# Patient Record
Sex: MALE | Race: WHITE | NOT HISPANIC OR LATINO | Employment: OTHER | URBAN - METROPOLITAN AREA
[De-identification: names, ages, dates, MRNs, and addresses within clinical notes are randomized per-mention and may not be internally consistent; named-entity substitution may affect disease eponyms.]

---

## 2017-08-17 ENCOUNTER — HOSPITAL ENCOUNTER (OUTPATIENT)
Dept: OTHER | Facility: HOSPITAL | Age: 72
Setting detail: SPECIMEN
Discharge: HOME | End: 2017-08-17
Attending: SURGERY | Admitting: SURGERY

## 2022-03-16 ENCOUNTER — HOSPITAL ENCOUNTER (EMERGENCY)
Facility: HOSPITAL | Age: 77
Discharge: HOME | End: 2022-03-16
Attending: EMERGENCY MEDICINE | Admitting: EMERGENCY MEDICINE

## 2022-03-16 VITALS
OXYGEN SATURATION: 100 % | HEART RATE: 85 BPM | WEIGHT: 202 LBS | TEMPERATURE: 98.1 F | SYSTOLIC BLOOD PRESSURE: 151 MMHG | BODY MASS INDEX: 29.92 KG/M2 | DIASTOLIC BLOOD PRESSURE: 76 MMHG | HEIGHT: 69 IN | RESPIRATION RATE: 15 BRPM

## 2022-03-16 DIAGNOSIS — I10 HYPERTENSION, UNSPECIFIED TYPE: ICD-10-CM

## 2022-03-16 DIAGNOSIS — R42 DIZZINESS: Primary | ICD-10-CM

## 2022-03-16 LAB
ANION GAP SERPL CALCULATED.3IONS-SCNC: 11 MMOL/L (ref 5–15)
BASOPHILS # BLD AUTO: 0 10*3/MM3 (ref 0–0.2)
BASOPHILS NFR BLD AUTO: 0.4 % (ref 0–1.5)
BUN SERPL-MCNC: 15 MG/DL (ref 8–23)
BUN/CREAT SERPL: 18.3 (ref 7–25)
CALCIUM SPEC-SCNC: 9 MG/DL (ref 8.6–10.5)
CHLORIDE SERPL-SCNC: 100 MMOL/L (ref 98–107)
CO2 SERPL-SCNC: 27 MMOL/L (ref 22–29)
CREAT SERPL-MCNC: 0.82 MG/DL (ref 0.76–1.27)
DEPRECATED RDW RBC AUTO: 45.1 FL (ref 37–54)
EGFRCR SERPLBLD CKD-EPI 2021: 89.9 ML/MIN/1.73
EOSINOPHIL # BLD AUTO: 0.3 10*3/MM3 (ref 0–0.4)
EOSINOPHIL NFR BLD AUTO: 2.4 % (ref 0.3–6.2)
ERYTHROCYTE [DISTWIDTH] IN BLOOD BY AUTOMATED COUNT: 15.7 % (ref 12.3–15.4)
GLUCOSE SERPL-MCNC: 159 MG/DL (ref 65–99)
HCT VFR BLD AUTO: 43.8 % (ref 37.5–51)
HGB BLD-MCNC: 14.7 G/DL (ref 13–17.7)
LYMPHOCYTES # BLD AUTO: 2.2 10*3/MM3 (ref 0.7–3.1)
LYMPHOCYTES NFR BLD AUTO: 20.2 % (ref 19.6–45.3)
MCH RBC QN AUTO: 27.4 PG (ref 26.6–33)
MCHC RBC AUTO-ENTMCNC: 33.5 G/DL (ref 31.5–35.7)
MCV RBC AUTO: 81.8 FL (ref 79–97)
MONOCYTES # BLD AUTO: 0.7 10*3/MM3 (ref 0.1–0.9)
MONOCYTES NFR BLD AUTO: 6.3 % (ref 5–12)
NEUTROPHILS NFR BLD AUTO: 7.5 10*3/MM3 (ref 1.7–7)
NEUTROPHILS NFR BLD AUTO: 70.7 % (ref 42.7–76)
NRBC BLD AUTO-RTO: 0 /100 WBC (ref 0–0.2)
PLATELET # BLD AUTO: 201 10*3/MM3 (ref 140–450)
PMV BLD AUTO: 7.1 FL (ref 6–12)
POTASSIUM SERPL-SCNC: 3.5 MMOL/L (ref 3.5–5.2)
RBC # BLD AUTO: 5.35 10*6/MM3 (ref 4.14–5.8)
SODIUM SERPL-SCNC: 138 MMOL/L (ref 136–145)
WBC NRBC COR # BLD: 10.7 10*3/MM3 (ref 3.4–10.8)
WHOLE BLOOD HOLD SPECIMEN: NORMAL

## 2022-03-16 PROCEDURE — 96374 THER/PROPH/DIAG INJ IV PUSH: CPT

## 2022-03-16 PROCEDURE — 36415 COLL VENOUS BLD VENIPUNCTURE: CPT

## 2022-03-16 PROCEDURE — 85025 COMPLETE CBC W/AUTO DIFF WBC: CPT | Performed by: EMERGENCY MEDICINE

## 2022-03-16 PROCEDURE — 93005 ELECTROCARDIOGRAM TRACING: CPT

## 2022-03-16 PROCEDURE — 99284 EMERGENCY DEPT VISIT MOD MDM: CPT

## 2022-03-16 PROCEDURE — 80048 BASIC METABOLIC PNL TOTAL CA: CPT | Performed by: EMERGENCY MEDICINE

## 2022-03-16 RX ORDER — SODIUM CHLORIDE 0.9 % (FLUSH) 0.9 %
10 SYRINGE (ML) INJECTION AS NEEDED
Status: DISCONTINUED | OUTPATIENT
Start: 2022-03-16 | End: 2022-03-16 | Stop reason: HOSPADM

## 2022-03-16 RX ORDER — ENALAPRILAT 2.5 MG/2ML
1.25 INJECTION INTRAVENOUS ONCE
Status: COMPLETED | OUTPATIENT
Start: 2022-03-16 | End: 2022-03-16

## 2022-03-16 RX ADMIN — ENALAPRILAT 1.25 MG: 2.5 INJECTION INTRAVENOUS at 12:04

## 2022-03-16 NOTE — ED PROVIDER NOTES
Subjective   Patient is a 76-year-old male who [facility].  Patient states he had some mild dizziness earlier today but is asymptomatic this time.  Denies headache chest pain shortness of breath or other complaint.          Review of Systems  Negative for headache earache throat neck pain cough fever chest pain shortness of breath abdominal pain vomiting diarrhea dysuria achiness weight loss or other complaint.  A complete review of system was obtained and is otherwise negative  No past medical history on file.    No Known Allergies    No past surgical history on file.    No family history on file.    Social History     Socioeconomic History   • Marital status:            Objective   Physical Exam  Neurologic exam is nonfocal.  HEENT exam shows TMs to be clear.  Oropharynx clear moist.  Sclerae nonicteric.  Neck has no adenopathy JVD or bruits.  Lungs are clear.  Heart has a regular rate rhythm without murmur rub or gallop.  Chest is nontender.  Abdomen is soft nontender.  Extremities M is no cyanosis or edema.  Procedures     My EKG interpretation shows normal sinus rhythm with no acute ST change at a rate of 100.      ED Course            Results for orders placed or performed during the hospital encounter of 03/16/22   Basic Metabolic Panel    Specimen: Blood   Result Value Ref Range    Glucose 159 (H) 65 - 99 mg/dL    BUN 15 8 - 23 mg/dL    Creatinine 0.82 0.76 - 1.27 mg/dL    Sodium 138 136 - 145 mmol/L    Potassium 3.5 3.5 - 5.2 mmol/L    Chloride 100 98 - 107 mmol/L    CO2 27.0 22.0 - 29.0 mmol/L    Calcium 9.0 8.6 - 10.5 mg/dL    BUN/Creatinine Ratio 18.3 7.0 - 25.0    Anion Gap 11.0 5.0 - 15.0 mmol/L    eGFR 89.9 >60.0 mL/min/1.73   CBC Auto Differential    Specimen: Blood   Result Value Ref Range    WBC 10.70 3.40 - 10.80 10*3/mm3    RBC 5.35 4.14 - 5.80 10*6/mm3    Hemoglobin 14.7 13.0 - 17.7 g/dL    Hematocrit 43.8 37.5 - 51.0 %    MCV 81.8 79.0 - 97.0 fL     MCH 27.4 26.6 - 33.0 pg    MCHC 33.5 31.5 - 35.7 g/dL    RDW 15.7 (H) 12.3 - 15.4 %    RDW-SD 45.1 37.0 - 54.0 fl    MPV 7.1 6.0 - 12.0 fL    Platelets 201 140 - 450 10*3/mm3    Neutrophil % 70.7 42.7 - 76.0 %    Lymphocyte % 20.2 19.6 - 45.3 %    Monocyte % 6.3 5.0 - 12.0 %    Eosinophil % 2.4 0.3 - 6.2 %    Basophil % 0.4 0.0 - 1.5 %    Neutrophils, Absolute 7.50 (H) 1.70 - 7.00 10*3/mm3    Lymphocytes, Absolute 2.20 0.70 - 3.10 10*3/mm3    Monocytes, Absolute 0.70 0.10 - 0.90 10*3/mm3    Eosinophils, Absolute 0.30 0.00 - 0.40 10*3/mm3    Basophils, Absolute 0.00 0.00 - 0.20 10*3/mm3    nRBC 0.0 0.0 - 0.2 /100 WBC   ECG 12 Lead   Result Value Ref Range    QT Interval 405 ms   Lavender Top   Result Value Ref Range    Extra Tube hold for add-on                                              MDM  Number of Diagnoses or Management Options  Diagnosis management comments: Patient had a nonfocal neurologic exam.  Metabolic panel is normal other than mild elevated glucose.  There is no evidence acute infectious process or anemia.  EKG shows no ectopy.  Patient was given enalapril 1.25 mg IV.  Current blood pressure is 140/80.  Patient remains asymptomatic.  Will be discharged.  He is instructed to follow-up with his family physician for further outpatient evaluation.       Amount and/or Complexity of Data Reviewed  Clinical lab tests: reviewed  Tests in the medicine section of CPT®: reviewed    Risk of Complications, Morbidity, and/or Mortality  Presenting problems: high  Diagnostic procedures: high  Management options: high    Patient Progress  Patient progress: stable      Final diagnoses:   Dizziness   Hypertension, unspecified type       ED Disposition  ED Disposition     ED Disposition   Discharge    Condition   Stable    Comment   --             No follow-up provider specified.       Medication List      No changes were made to your prescriptions during this visit.          Kai Greenfield MD  03/16/22 0450

## 2022-03-24 LAB — QT INTERVAL: 405 MS

## 2024-04-04 ENCOUNTER — HOSPITAL ENCOUNTER (OUTPATIENT)
Facility: HOSPITAL | Age: 79
Discharge: HOME | End: 2024-04-04
Attending: EMERGENCY MEDICINE | Admitting: EMERGENCY MEDICINE
Payer: MEDICARE

## 2024-04-04 VITALS
TEMPERATURE: 97.5 F | SYSTOLIC BLOOD PRESSURE: 157 MMHG | BODY MASS INDEX: 30.96 KG/M2 | OXYGEN SATURATION: 100 % | RESPIRATION RATE: 18 BRPM | HEIGHT: 69 IN | DIASTOLIC BLOOD PRESSURE: 75 MMHG | HEART RATE: 82 BPM | WEIGHT: 209 LBS

## 2024-04-04 DIAGNOSIS — N39.0 UTI (URINARY TRACT INFECTION) WITH PYURIA: Primary | ICD-10-CM

## 2024-04-04 LAB
BILIRUB UR QL STRIP: NEGATIVE
CLARITY UR: ABNORMAL
COLOR UR: YELLOW
GLUCOSE UR STRIP-MCNC: NEGATIVE MG/DL
HGB UR QL STRIP.AUTO: ABNORMAL
KETONES UR QL STRIP: NEGATIVE
LEUKOCYTE ESTERASE UR QL STRIP.AUTO: ABNORMAL
NITRITE UR QL STRIP: NEGATIVE
PH UR STRIP.AUTO: 6 [PH] (ref 5–8)
PROT UR QL STRIP: ABNORMAL
SP GR UR STRIP: 1.01 (ref 1–1.03)
UROBILINOGEN UR QL STRIP: ABNORMAL

## 2024-04-04 PROCEDURE — G0463 HOSPITAL OUTPT CLINIC VISIT: HCPCS | Performed by: EMERGENCY MEDICINE

## 2024-04-04 PROCEDURE — 99213 OFFICE O/P EST LOW 20 MIN: CPT | Performed by: EMERGENCY MEDICINE

## 2024-04-04 PROCEDURE — 81003 URINALYSIS AUTO W/O SCOPE: CPT | Performed by: EMERGENCY MEDICINE

## 2024-04-04 RX ORDER — SULFAMETHOXAZOLE AND TRIMETHOPRIM 800; 160 MG/1; MG/1
1 TABLET ORAL 2 TIMES DAILY
Qty: 14 TABLET | Refills: 0 | Status: SHIPPED | OUTPATIENT
Start: 2024-04-04 | End: 2024-04-04 | Stop reason: ALTCHOICE

## 2024-04-04 RX ORDER — TAMSULOSIN HYDROCHLORIDE 0.4 MG/1
1 CAPSULE ORAL DAILY
Qty: 30 CAPSULE | Refills: 0 | Status: SHIPPED | OUTPATIENT
Start: 2024-04-04

## 2024-04-04 RX ORDER — CEFDINIR 300 MG/1
300 CAPSULE ORAL 2 TIMES DAILY
Qty: 10 CAPSULE | Refills: 0 | Status: SHIPPED | OUTPATIENT
Start: 2024-04-04 | End: 2024-04-09

## 2024-04-04 NOTE — FSED PROVIDER NOTE
Subjective   History of Present Illness  Is a 79-year-old male presenting to the urgent care with chief complaint of difficulty with urination.  Patient states he was having frequent but small amounts of urine which started yesterday.  [facility] which showed bacteria in his urine.  Patient did not receive any antibiotics at that time.  Patient notes that her urine to be cloudy and dark.  He denies any abdominal pain.  No back pain no fever.        Review of Systems   Genitourinary:  Positive for difficulty urinating, frequency and urgency.   All other systems reviewed and are negative.      No past medical history on file.    No Known Allergies    No past surgical history on file.    No family history on file.    Social History     Socioeconomic History    Marital status:            Objective   Physical Exam  Vitals and nursing note reviewed.   Constitutional:       Appearance: Normal appearance.   HENT:      Head: Normocephalic and atraumatic.   Cardiovascular:      Rate and Rhythm: Normal rate.   Pulmonary:      Effort: Pulmonary effort is normal.      Breath sounds: Normal breath sounds.   Abdominal:      General: Abdomen is flat.      Palpations: Abdomen is soft.      Comments: Ventral hernia   Musculoskeletal:         General: Normal range of motion.   Skin:     General: Skin is warm and dry.      Capillary Refill: Capillary refill takes less than 2 seconds.   Neurological:      General: No focal deficit present.      Mental Status: He is alert and oriented to person, place, and time.   Psychiatric:         Mood and Affect: Mood normal.         Behavior: Behavior normal.         Procedures           ED Course                                 Bladder scan was reveals post void residual          Medical Decision Making  Problems Addressed:  UTI (urinary tract infection) with pyuria: complicated acute illness or injury    Risk  Prescription drug  management.        Final diagnoses:   UTI (urinary tract infection) with pyuria       ED Disposition  ED Disposition       ED Disposition   Discharge    Condition   Stable    Comment   --               Jonh Moran MD  [address] [place] [address]  [phone]    In 2 days  As needed    [place] [id number] [facility] [address] [phone]    If symptoms worsen         Medication List        New Prescriptions      sulfamethoxazole-trimethoprim 800-160 MG per tablet  Commonly known as: BACTRIM DS,SEPTRA DS  Take 1 tablet by mouth 2 (Two) Times a Day.     tamsulosin 0.4 MG capsule 24 hr capsule  Commonly known as: FLOMAX  Take 1 capsule by mouth Daily.               Where to Get Your Medications        These medications were sent to [id number] [place] [facility] [address] [facility] [address] - [phone] PH - [phone] FX  [address] [place] [address]      Phone: [phone]   sulfamethoxazole-trimethoprim 800-160 MG per tablet  tamsulosin 0.4 MG capsule 24 hr capsule

## 2024-05-20 ENCOUNTER — APPOINTMENT (OUTPATIENT)
Dept: GENERAL RADIOLOGY | Facility: HOSPITAL | Age: 79
End: 2024-05-20
Payer: MEDICARE

## 2024-05-20 ENCOUNTER — HOSPITAL ENCOUNTER (INPATIENT)
Facility: HOSPITAL | Age: 79
LOS: 2 days | Discharge: HOME | End: 2024-05-23
Attending: EMERGENCY MEDICINE | Admitting: INTERNAL MEDICINE
Payer: MEDICARE

## 2024-05-20 DIAGNOSIS — R31.9 URINARY TRACT INFECTION WITH HEMATURIA, SITE UNSPECIFIED: Primary | ICD-10-CM

## 2024-05-20 DIAGNOSIS — R55 SYNCOPE, UNSPECIFIED SYNCOPE TYPE: ICD-10-CM

## 2024-05-20 DIAGNOSIS — N39.0 URINARY TRACT INFECTION WITH HEMATURIA, SITE UNSPECIFIED: Primary | ICD-10-CM

## 2024-05-20 DIAGNOSIS — A41.9 SEPSIS, DUE TO UNSPECIFIED ORGANISM, UNSPECIFIED WHETHER ACUTE ORGAN DYSFUNCTION PRESENT: ICD-10-CM

## 2024-05-20 LAB
ALBUMIN SERPL-MCNC: 4.3 G/DL (ref 3.5–5.2)
ALBUMIN/GLOB SERPL: 1.2 G/DL
ALP SERPL-CCNC: 86 U/L (ref 39–117)
ALT SERPL W P-5'-P-CCNC: 20 U/L (ref 1–41)
ANION GAP SERPL CALCULATED.3IONS-SCNC: 13 MMOL/L (ref 5–15)
AST SERPL-CCNC: 23 U/L (ref 1–40)
BACTERIA UR QL AUTO: ABNORMAL /HPF
BASOPHILS # BLD AUTO: 0.05 10*3/MM3 (ref 0–0.2)
BASOPHILS NFR BLD AUTO: 0.3 % (ref 0–1.5)
BILIRUB SERPL-MCNC: 0.7 MG/DL (ref 0–1.2)
BILIRUB UR QL STRIP: NEGATIVE
BUN SERPL-MCNC: 16 MG/DL (ref 8–23)
BUN/CREAT SERPL: 16.3 (ref 7–25)
CALCIUM SPEC-SCNC: 9.4 MG/DL (ref 8.6–10.5)
CHLORIDE SERPL-SCNC: 101 MMOL/L (ref 98–107)
CK SERPL-CCNC: 267 U/L (ref 20–200)
CLARITY UR: ABNORMAL
CO2 SERPL-SCNC: 24 MMOL/L (ref 22–29)
COLOR UR: YELLOW
CREAT SERPL-MCNC: 0.98 MG/DL (ref 0.76–1.27)
D-LACTATE SERPL-SCNC: 1.3 MMOL/L (ref 0.5–2)
D-LACTATE SERPL-SCNC: 1.4 MMOL/L (ref 0.3–2)
DEPRECATED RDW RBC AUTO: 48 FL (ref 37–54)
EGFRCR SERPLBLD CKD-EPI 2021: 77.5 ML/MIN/1.73
EOSINOPHIL # BLD AUTO: 0.01 10*3/MM3 (ref 0–0.4)
EOSINOPHIL NFR BLD AUTO: 0.1 % (ref 0.3–6.2)
ERYTHROCYTE [DISTWIDTH] IN BLOOD BY AUTOMATED COUNT: 15.1 % (ref 12.3–15.4)
GLOBULIN UR ELPH-MCNC: 3.5 GM/DL
GLUCOSE SERPL-MCNC: 180 MG/DL (ref 65–99)
GLUCOSE UR STRIP-MCNC: NEGATIVE MG/DL
HCT VFR BLD AUTO: 42.5 % (ref 37.5–51)
HGB BLD-MCNC: 13.4 G/DL (ref 13–17.7)
HGB UR QL STRIP.AUTO: ABNORMAL
HOLD SPECIMEN: NORMAL
HOLD SPECIMEN: NORMAL
HYALINE CASTS UR QL AUTO: ABNORMAL /LPF
IMM GRANULOCYTES # BLD AUTO: 0.2 10*3/MM3 (ref 0–0.05)
IMM GRANULOCYTES NFR BLD AUTO: 1 % (ref 0–0.5)
KETONES UR QL STRIP: ABNORMAL
LEUKOCYTE ESTERASE UR QL STRIP.AUTO: ABNORMAL
LYMPHOCYTES # BLD AUTO: 1.55 10*3/MM3 (ref 0.7–3.1)
LYMPHOCYTES NFR BLD AUTO: 7.9 % (ref 19.6–45.3)
MCH RBC QN AUTO: 27.2 PG (ref 26.6–33)
MCHC RBC AUTO-ENTMCNC: 31.5 G/DL (ref 31.5–35.7)
MCV RBC AUTO: 86.2 FL (ref 79–97)
MONOCYTES # BLD AUTO: 1.47 10*3/MM3 (ref 0.1–0.9)
MONOCYTES NFR BLD AUTO: 7.5 % (ref 5–12)
NEUTROPHILS NFR BLD AUTO: 16.32 10*3/MM3 (ref 1.7–7)
NEUTROPHILS NFR BLD AUTO: 83.2 % (ref 42.7–76)
NITRITE UR QL STRIP: POSITIVE
NRBC BLD AUTO-RTO: 0 /100 WBC (ref 0–0.2)
PH UR STRIP.AUTO: 5.5 [PH] (ref 5–8)
PLATELET # BLD AUTO: 191 10*3/MM3 (ref 140–450)
PMV BLD AUTO: 9.7 FL (ref 6–12)
POTASSIUM SERPL-SCNC: 4.1 MMOL/L (ref 3.5–5.2)
PROT SERPL-MCNC: 7.8 G/DL (ref 6–8.5)
PROT UR QL STRIP: ABNORMAL
RBC # BLD AUTO: 4.93 10*6/MM3 (ref 4.14–5.8)
RBC # UR STRIP: ABNORMAL /HPF
REF LAB TEST METHOD: ABNORMAL
SODIUM SERPL-SCNC: 138 MMOL/L (ref 136–145)
SP GR UR STRIP: 1.01 (ref 1–1.03)
SQUAMOUS #/AREA URNS HPF: ABNORMAL /HPF
TROPONIN T SERPL HS-MCNC: 11 NG/L
UROBILINOGEN UR QL STRIP: ABNORMAL
WBC # UR STRIP: ABNORMAL /HPF
WBC NRBC COR # BLD AUTO: 19.6 10*3/MM3 (ref 3.4–10.8)
WHOLE BLOOD HOLD COAG: NORMAL
WHOLE BLOOD HOLD SPECIMEN: NORMAL

## 2024-05-20 PROCEDURE — 83605 ASSAY OF LACTIC ACID: CPT | Performed by: EMERGENCY MEDICINE

## 2024-05-20 PROCEDURE — 93005 ELECTROCARDIOGRAM TRACING: CPT | Performed by: EMERGENCY MEDICINE

## 2024-05-20 PROCEDURE — 25010000002 MORPHINE PER 10 MG: Performed by: INTERNAL MEDICINE

## 2024-05-20 PROCEDURE — 72072 X-RAY EXAM THORAC SPINE 3VWS: CPT

## 2024-05-20 PROCEDURE — 25010000002 ENOXAPARIN PER 10 MG: Performed by: INTERNAL MEDICINE

## 2024-05-20 PROCEDURE — 80053 COMPREHEN METABOLIC PANEL: CPT | Performed by: EMERGENCY MEDICINE

## 2024-05-20 PROCEDURE — 36415 COLL VENOUS BLD VENIPUNCTURE: CPT

## 2024-05-20 PROCEDURE — G0378 HOSPITAL OBSERVATION PER HR: HCPCS

## 2024-05-20 PROCEDURE — 87154 CUL TYP ID BLD PTHGN 6+ TRGT: CPT | Performed by: EMERGENCY MEDICINE

## 2024-05-20 PROCEDURE — 25810000003 LACTATED RINGERS PER 1000 ML: Performed by: EMERGENCY MEDICINE

## 2024-05-20 PROCEDURE — 85025 COMPLETE CBC W/AUTO DIFF WBC: CPT | Performed by: EMERGENCY MEDICINE

## 2024-05-20 PROCEDURE — 72110 X-RAY EXAM L-2 SPINE 4/>VWS: CPT

## 2024-05-20 PROCEDURE — 83605 ASSAY OF LACTIC ACID: CPT | Performed by: INTERNAL MEDICINE

## 2024-05-20 PROCEDURE — 25810000003 SODIUM CHLORIDE 0.9 % SOLUTION: Performed by: INTERNAL MEDICINE

## 2024-05-20 PROCEDURE — 87040 BLOOD CULTURE FOR BACTERIA: CPT | Performed by: EMERGENCY MEDICINE

## 2024-05-20 PROCEDURE — 81001 URINALYSIS AUTO W/SCOPE: CPT | Performed by: EMERGENCY MEDICINE

## 2024-05-20 PROCEDURE — 87077 CULTURE AEROBIC IDENTIFY: CPT | Performed by: EMERGENCY MEDICINE

## 2024-05-20 PROCEDURE — 99285 EMERGENCY DEPT VISIT HI MDM: CPT

## 2024-05-20 PROCEDURE — 87186 SC STD MICRODIL/AGAR DIL: CPT | Performed by: EMERGENCY MEDICINE

## 2024-05-20 PROCEDURE — 82550 ASSAY OF CK (CPK): CPT | Performed by: EMERGENCY MEDICINE

## 2024-05-20 PROCEDURE — 84484 ASSAY OF TROPONIN QUANT: CPT | Performed by: EMERGENCY MEDICINE

## 2024-05-20 PROCEDURE — 25010000002 CEFTRIAXONE PER 250 MG: Performed by: EMERGENCY MEDICINE

## 2024-05-20 PROCEDURE — 87086 URINE CULTURE/COLONY COUNT: CPT | Performed by: EMERGENCY MEDICINE

## 2024-05-20 RX ORDER — ATORVASTATIN CALCIUM 20 MG/1
20 TABLET, FILM COATED ORAL NIGHTLY
COMMUNITY

## 2024-05-20 RX ORDER — TAMSULOSIN HYDROCHLORIDE 0.4 MG/1
0.4 CAPSULE ORAL DAILY
Status: DISCONTINUED | OUTPATIENT
Start: 2024-05-20 | End: 2024-05-23 | Stop reason: HOSPADM

## 2024-05-20 RX ORDER — MORPHINE SULFATE 2 MG/ML
2 INJECTION, SOLUTION INTRAMUSCULAR; INTRAVENOUS EVERY 4 HOURS PRN
Status: DISCONTINUED | OUTPATIENT
Start: 2024-05-20 | End: 2024-05-23 | Stop reason: HOSPADM

## 2024-05-20 RX ORDER — ONDANSETRON 2 MG/ML
4 INJECTION INTRAMUSCULAR; INTRAVENOUS EVERY 6 HOURS PRN
Status: DISCONTINUED | OUTPATIENT
Start: 2024-05-20 | End: 2024-05-23 | Stop reason: HOSPADM

## 2024-05-20 RX ORDER — BISACODYL 5 MG/1
5 TABLET, DELAYED RELEASE ORAL DAILY PRN
Status: DISCONTINUED | OUTPATIENT
Start: 2024-05-20 | End: 2024-05-23 | Stop reason: HOSPADM

## 2024-05-20 RX ORDER — MELATONIN
1000 NIGHTLY
COMMUNITY

## 2024-05-20 RX ORDER — LOSARTAN POTASSIUM 50 MG/1
25 TABLET ORAL NIGHTLY
COMMUNITY

## 2024-05-20 RX ORDER — POLYETHYLENE GLYCOL 3350 17 G/17G
17 POWDER, FOR SOLUTION ORAL DAILY PRN
Status: DISCONTINUED | OUTPATIENT
Start: 2024-05-20 | End: 2024-05-23 | Stop reason: HOSPADM

## 2024-05-20 RX ORDER — SODIUM CHLORIDE 0.9 % (FLUSH) 0.9 %
10 SYRINGE (ML) INJECTION AS NEEDED
Status: DISCONTINUED | OUTPATIENT
Start: 2024-05-20 | End: 2024-05-23 | Stop reason: HOSPADM

## 2024-05-20 RX ORDER — MAGNESIUM OXIDE 400 MG/1
400 TABLET ORAL NIGHTLY
COMMUNITY

## 2024-05-20 RX ORDER — ENOXAPARIN SODIUM 100 MG/ML
40 INJECTION SUBCUTANEOUS DAILY
Status: DISCONTINUED | OUTPATIENT
Start: 2024-05-20 | End: 2024-05-23 | Stop reason: HOSPADM

## 2024-05-20 RX ORDER — ACETAMINOPHEN 325 MG/1
650 TABLET ORAL EVERY 6 HOURS PRN
Status: DISCONTINUED | OUTPATIENT
Start: 2024-05-20 | End: 2024-05-23 | Stop reason: HOSPADM

## 2024-05-20 RX ORDER — SODIUM CHLORIDE 0.9 % (FLUSH) 0.9 %
10 SYRINGE (ML) INJECTION EVERY 12 HOURS SCHEDULED
Status: DISCONTINUED | OUTPATIENT
Start: 2024-05-20 | End: 2024-05-23 | Stop reason: HOSPADM

## 2024-05-20 RX ORDER — BISACODYL 10 MG
10 SUPPOSITORY, RECTAL RECTAL DAILY PRN
Status: DISCONTINUED | OUTPATIENT
Start: 2024-05-20 | End: 2024-05-23 | Stop reason: HOSPADM

## 2024-05-20 RX ORDER — SODIUM CHLORIDE 9 MG/ML
40 INJECTION, SOLUTION INTRAVENOUS AS NEEDED
Status: DISCONTINUED | OUTPATIENT
Start: 2024-05-20 | End: 2024-05-23 | Stop reason: HOSPADM

## 2024-05-20 RX ORDER — AMOXICILLIN 250 MG
2 CAPSULE ORAL 2 TIMES DAILY PRN
Status: DISCONTINUED | OUTPATIENT
Start: 2024-05-20 | End: 2024-05-23 | Stop reason: HOSPADM

## 2024-05-20 RX ORDER — SODIUM CHLORIDE 9 MG/ML
100 INJECTION, SOLUTION INTRAVENOUS CONTINUOUS
Status: DISCONTINUED | OUTPATIENT
Start: 2024-05-20 | End: 2024-05-23 | Stop reason: HOSPADM

## 2024-05-20 RX ORDER — HYDROCODONE BITARTRATE AND ACETAMINOPHEN 5; 325 MG/1; MG/1
1 TABLET ORAL EVERY 6 HOURS PRN
Status: DISCONTINUED | OUTPATIENT
Start: 2024-05-20 | End: 2024-05-23 | Stop reason: HOSPADM

## 2024-05-20 RX ORDER — SODIUM CHLORIDE, SODIUM LACTATE, POTASSIUM CHLORIDE, CALCIUM CHLORIDE 600; 310; 30; 20 MG/100ML; MG/100ML; MG/100ML; MG/100ML
125 INJECTION, SOLUTION INTRAVENOUS CONTINUOUS
Status: DISCONTINUED | OUTPATIENT
Start: 2024-05-20 | End: 2024-05-20

## 2024-05-20 RX ORDER — AMLODIPINE BESYLATE 5 MG/1
2.5 TABLET ORAL NIGHTLY
COMMUNITY

## 2024-05-20 RX ADMIN — ENOXAPARIN SODIUM 40 MG: 100 INJECTION SUBCUTANEOUS at 16:31

## 2024-05-20 RX ADMIN — MORPHINE SULFATE 2 MG: 2 INJECTION, SOLUTION INTRAMUSCULAR; INTRAVENOUS at 17:47

## 2024-05-20 RX ADMIN — ACETAMINOPHEN 650 MG: 325 TABLET, FILM COATED ORAL at 17:47

## 2024-05-20 RX ADMIN — SODIUM CHLORIDE 100 ML/HR: 9 INJECTION, SOLUTION INTRAVENOUS at 16:08

## 2024-05-20 RX ADMIN — SODIUM CHLORIDE, POTASSIUM CHLORIDE, SODIUM LACTATE AND CALCIUM CHLORIDE 125 ML/HR: 600; 310; 30; 20 INJECTION, SOLUTION INTRAVENOUS at 12:50

## 2024-05-20 RX ADMIN — MORPHINE SULFATE 2 MG: 2 INJECTION, SOLUTION INTRAMUSCULAR; INTRAVENOUS at 22:58

## 2024-05-20 RX ADMIN — CEFTRIAXONE 1000 MG: 1 INJECTION, POWDER, FOR SOLUTION INTRAMUSCULAR; INTRAVENOUS at 12:49

## 2024-05-20 NOTE — ED PROVIDER NOTES
Subjective   History of Present Illness  79-year-old male presents with complaints he had nausea last night was sitting when he passed out.  He states he was on the floor for couple of hours.  He reports no fever.  He complains of pain in his mid back.  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  No vomiting or diarrhea.  He reports he has had some burning with urination.  Review of Systems    No past medical history on file.  BPH hyperlipidemia hypertension  No Known Allergies    No past surgical history on file.    No family history on file.    Social History     Socioeconomic History    Marital status:      Home medications include amlodipine Lipitor Cozaar      Objective   Physical Exam  79-year-old male awake alert.  Generally well-developed well-nourished.  He is very hard of hearing.  Pupils equal round react light.  No pharynx tenderness.  Some erythema to right side of neck anteriorly underneath chin.  No significant tenderness.  He complains of lower thoracic upper lumbar pain.  Chest clear equal breath sounds.  Cardiovascular regular rhythm abdomen soft nontender.   reveals normal male genitalia without abnormality noted.  He has minor contusion to right knee.  He has intact movement of legs neurovasc intact without deficit.  No obvious ligamentous laxity.  Neurologic exam without focal findings noted.  Procedures           ED Course      Results for orders placed or performed during the hospital encounter of 05/20/24   Comprehensive Metabolic Panel    Specimen: Blood   Result Value Ref Range    Glucose 180 (H) 65 - 99 mg/dL    BUN 16 8 - 23 mg/dL    Creatinine 0.98 0.76 - 1.27 mg/dL    Sodium 138 136 - 145 mmol/L    Potassium 4.1 3.5 - 5.2 mmol/L    Chloride 101 98 - 107 mmol/L    CO2 24.0 22.0 - 29.0 mmol/L    Calcium 9.4 8.6 - 10.5 mg/dL    Total Protein 7.8 6.0 - 8.5 g/dL    Albumin 4.3 3.5 - 5.2 g/dL    ALT (SGPT) 20 1 - 41 U/L    AST (SGOT) 23  1 - 40 U/L    Alkaline Phosphatase 86 39 - 117 U/L    Total Bilirubin 0.7 0.0 - 1.2 mg/dL    Globulin 3.5 gm/dL    A/G Ratio 1.2 g/dL    BUN/Creatinine Ratio 16.3 7.0 - 25.0    Anion Gap 13.0 5.0 - 15.0 mmol/L    eGFR 77.5 >60.0 mL/min/1.73   Single High Sensitivity Troponin T    Specimen: Blood   Result Value Ref Range    HS Troponin T 11 <22 ng/L   CK    Specimen: Blood   Result Value Ref Range    Creatine Kinase 267 (H) 20 - 200 U/L   CBC Auto Differential    Specimen: Blood   Result Value Ref Range    WBC 19.60 (H) 3.40 - 10.80 10*3/mm3    RBC 4.93 4.14 - 5.80 10*6/mm3    Hemoglobin 13.4 13.0 - 17.7 g/dL    Hematocrit 42.5 37.5 - 51.0 %    MCV 86.2 79.0 - 97.0 fL    MCH 27.2 26.6 - 33.0 pg    MCHC 31.5 31.5 - 35.7 g/dL    RDW 15.1 12.3 - 15.4 %    RDW-SD 48.0 37.0 - 54.0 fl    MPV 9.7 6.0 - 12.0 fL    Platelets 191 140 - 450 10*3/mm3    Neutrophil % 83.2 (H) 42.7 - 76.0 %    Lymphocyte % 7.9 (L) 19.6 - 45.3 %    Monocyte % 7.5 5.0 - 12.0 %    Eosinophil % 0.1 (L) 0.3 - 6.2 %    Basophil % 0.3 0.0 - 1.5 %    Immature Grans % 1.0 (H) 0.0 - 0.5 %    Neutrophils, Absolute 16.32 (H) 1.70 - 7.00 10*3/mm3    Lymphocytes, Absolute 1.55 0.70 - 3.10 10*3/mm3    Monocytes, Absolute 1.47 (H) 0.10 - 0.90 10*3/mm3    Eosinophils, Absolute 0.01 0.00 - 0.40 10*3/mm3    Basophils, Absolute 0.05 0.00 - 0.20 10*3/mm3    Immature Grans, Absolute 0.20 (H) 0.00 - 0.05 10*3/mm3    nRBC 0.0 0.0 - 0.2 /100 WBC   Urinalysis With Culture If Indicated - Urine, Clean Catch    Specimen: Urine, Clean Catch   Result Value Ref Range    Color, UA Yellow Yellow, Straw    Appearance, UA Hazy (A) Clear    pH, UA 5.5 5.0 - 8.0    Specific Gravity, UA 1.015 1.005 - 1.030    Glucose, UA Negative Negative    Ketones, UA 15 mg/dL (1+) (A) Negative    Bilirubin, UA Negative Negative    Blood, UA Moderate (2+) (A) Negative    Protein, UA 30 mg/dL (1+) (A) Negative    Leuk Esterase, UA Moderate (2+) (A) Negative    Nitrite, UA Positive (A) Negative     Urobilinogen, UA 0.2 E.U./dL 0.2 - 1.0 E.U./dL   Urinalysis, Microscopic Only - Urine, Clean Catch    Specimen: Urine, Clean Catch   Result Value Ref Range    RBC, UA 6-10 (A) None Seen, 0-2 /HPF    WBC, UA 21-50 (A) None Seen, 0-2 /HPF    Bacteria, UA 4+ (A) None Seen /HPF    Squamous Epithelial Cells, UA 0-2 None Seen, 0-2 /HPF    Hyaline Casts, UA 0-2 None Seen /LPF    Methodology Automated Microscopy    POC Lactate    Specimen: Blood   Result Value Ref Range    Lactate 1.4 0.3 - 2.0 mmol/L   ECG 12 Lead Syncope   Result Value Ref Range    QT Interval 372 ms    QTC Interval 447 ms   Green Top (Gel)   Result Value Ref Range    Extra Tube Hold for add-ons.    Lavender Top   Result Value Ref Range    Extra Tube hold for add-on    Gold Top - SST   Result Value Ref Range    Extra Tube Hold for add-ons.    Light Blue Top   Result Value Ref Range    Extra Tube Hold for add-ons.      XR Spine Thoracic 3 View    Result Date: 5/20/2024  Impression: 1. No acute findings within the thoracic or lumbar spine. 2. Advanced L4-5 and moderate L5-S1 diminished disc space height, along with anterior and posterior osteophyte formation. 3. Moderate anterolateral bridging osteophyte formation is demonstrated within the mid to lower thoracic spine. Electronically Signed: Lita Watters MD  5/20/2024 11:09 AM EDT  Workstation ID: [id number]    XR Spine Lumbar Complete 4+VW    Result Date: 5/20/2024  Impression: 1. No acute findings within the thoracic or lumbar spine. 2. Advanced L4-5 and moderate L5-S1 diminished disc space height, along with anterior and posterior osteophyte formation. 3. Moderate anterolateral bridging osteophyte formation is demonstrated within the mid to lower thoracic spine. Electronically Signed: Lita Watters MD  5/20/2024 11:09 AM EDT  Workstation ID: [id number]   Medications   sodium chloride 0.9 % flush 10 mL (has no administration in time range)   lactated ringers infusion (125 mL/hr Intravenous New Bag 5/20/24  "1250)   cefTRIAXone (ROCEPHIN) 1,000 mg in sodium chloride 0.9 % 100 mL MBP (1,000 mg Intravenous New Bag 5/20/24 1249)     /65   Pulse 86   Temp 97.9 °F (36.6 °C) (Oral)   Resp 16   Ht 175.3 cm (69\")   Wt 87.1 kg (192 lb)   SpO2 96%   BMI 28.35 kg/m²                                          Medical Decision Making  Problems Addressed:  Sepsis, due to unspecified organism, unspecified whether acute organ dysfunction present: complicated acute illness or injury  Syncope, unspecified syncope type: complicated acute illness or injury  Urinary tract infection with hematuria, site unspecified: complicated acute illness or injury    Amount and/or Complexity of Data Reviewed  Labs: ordered.  Radiology: ordered.  ECG/medicine tests: ordered.    Risk  Prescription drug management.  Decision regarding hospitalization.    Chart review: Patient had been treated for UTI at outside ER last month.  Culture does not appear to have been done.  Comorbidity: As per past history   Differential: Syncope, arrhythmia, orthostasis, sepsis, UTI, fracture  My EKG interpretation: Sinus rhythm rate of 87.  Compared to previous right bundle branch block no longer present sinus rhythm has replaced sinus tachycardia.  Cannot exclude old lateral infarct  Lab: Mild elevation of CK at 267 troponin normal at 11 CBC reveals a white count of 19.6 with hemoglobin 13.4 platelet count 191 83 segs no bands comprehensive metabolic panel remark for glucose of 180 urinalysis nitrite +20 1-50 white cells with 4+ bacteria this was sent for C&S, lactic acid normal 1.4  My Radiology review and interpretation: X-rays of thoracic and lumbar spine negative for fracture there are anterior osteophytes noted lumbar and thoracic spine.  Degenerative change most pronounced L4-5 and L5-S1.  Discussion/treatment: Patient's findings were discussed with him.  He was started IV fluids.  He was given Rocephin.  Patient was discussed with the hospitalist.  Will be " admitted to their service placed on telemetry for continued care further evaluation as needed  Patient was evaluated using appropriate PPE      Final diagnoses:   Urinary tract infection with hematuria, site unspecified   Sepsis, due to unspecified organism, unspecified whether acute organ dysfunction present   Syncope, unspecified syncope type       ED Disposition  ED Disposition       ED Disposition   Decision to Admit    Condition   --    Comment   Level of Care: Telemetry [5]   Admitting Physician: URBANO VASQUEZ [[id number]]                 No follow-up provider specified.       Medication List      No changes were made to your prescriptions during this visit.            Aron Waterman MD  05/20/24 4789

## 2024-05-20 NOTE — ED NOTES
Nursing report ED to floor  Almas Joseph  79 y.o.  male    HPI:   Chief Complaint   Patient presents with    Weakness - Generalized       Admitting doctor:   Audi Bergeron MD    Admitting diagnosis:   The primary encounter diagnosis was Urinary tract infection with hematuria, site unspecified. Diagnoses of Sepsis, due to unspecified organism, unspecified whether acute organ dysfunction present and Syncope, unspecified syncope type were also pertinent to this visit.    Code status:   Current Code Status       Date Active Code Status Order ID Comments User Context       5/20/2024 1346 CPR (Attempt to Resuscitate) [id number]  Audi Bergeron MD ED        Question Answer    Code Status (Patient has no pulse and is not breathing) CPR (Attempt to Resuscitate)    Medical Interventions (Patient has pulse or is breathing) Full Support    Level Of Support Discussed With Patient                    Allergies:   Bactrim [sulfamethoxazole-trimethoprim], Ciprofloxacin, and Nsaids    Isolation:  No active isolations     Fall Risk:  Fall Risk Assessment was completed, and patient is at high risk for falls.   Predictive Model Details         7 (Low) Factor Value    Calculated 5/20/2024 17:24 Age 79    Risk of Fall Model Active Peripheral IV Present     Imaging order in this encounter Present     Magnesium not on file     Number of Distinct Medication Classes administered 3     Drug Use Not Asked     Good Scale not on file     Diastolic BP 78     Clinically Relevant Sex Not Female     Total Bilirubin 0.7 mg/dL     Chloride 101 mmol/L     Gastrointestinal Assessment X     Respiratory Rate 16     Days after Admission 0.31     Number of administrations of Anti-Coagulants 1     Calcium 9.4 mg/dL     Albumin 4.3 g/dL     ALT 20 U/L     Potassium 4.1 mmol/L     Tobacco Use Not Asked     Creatinine 0.98 mg/dL         Weight:       05/20/24  0954   Weight: 87.1 kg (192 lb)       Intake and Output    Intake/Output Summary (Last 24  hours) at 5/20/2024 1724  Last data filed at 5/20/2024 1646  Gross per 24 hour   Intake 322.92 ml   Output 650 ml   Net -327.08 ml       Diet:   Dietary Orders (From admission, onward)       Start     Ordered    05/20/24 1520  Diet: Cardiac; Healthy Heart (2-3 Na+); Fluid Consistency: Thin (IDDSI 0)  Diet Effective Now        References:    Diet Order [facility]   Question Answer Comment   Diets: Cardiac    Cardiac Diet: Healthy Heart (2-3 Na+)    Fluid Consistency: Thin (IDDSI 0)        05/20/24 1519                     Most recent vitals:   Vitals:    05/20/24 1528 05/20/24 1558 05/20/24 1628 05/20/24 1658   BP: 168/79 148/84 146/84 158/78   Patient Position:       Pulse:    110   Resp:       Temp:       TempSrc:       SpO2:    94%   Weight:       Height:           Active LDAs/IV Access:   Lines, Drains & Airways       Active LDAs       Name Placement date Placement time Site Days    Peripheral IV 05/20/24 1249 Right Antecubital 05/20/24  1249  Antecubital  less than 1    Peripheral IV 05/20/24 1030 Left Antecubital 05/20/24  1030  Antecubital  less than 1                    Skin Condition:   Skin Assessments (last day)       None             Labs (abnormal labs have a star):   Labs Reviewed   COMPREHENSIVE METABOLIC PANEL - Abnormal; Notable for the following components:       Result Value    Glucose 180 (*)     All other components within normal limits    Narrative:     GFR Normal >60  Chronic Kidney Disease <60  Kidney Failure <15    The GFR formula is only valid for adults with stable renal function between ages 18 and 70.   CK - Abnormal; Notable for the following components:    Creatine Kinase 267 (*)     All other components within normal limits   CBC WITH AUTO DIFFERENTIAL - Abnormal; Notable for the following components:    WBC 19.60 (*)     Neutrophil % 83.2 (*)     Lymphocyte % 7.9 (*)     Eosinophil % 0.1 (*)     Immature Grans % 1.0 (*)     Neutrophils, Absolute 16.32 (*)     Monocytes, Absolute 1.47  (*)     Immature Grans, Absolute 0.20 (*)     All other components within normal limits   URINALYSIS W/ CULTURE IF INDICATED - Abnormal; Notable for the following components:    Appearance, UA Hazy (*)     Ketones, UA 15 mg/dL (1+) (*)     Blood, UA Moderate (2+) (*)     Protein, UA 30 mg/dL (1+) (*)     Leuk Esterase, UA Moderate (2+) (*)     Nitrite, UA Positive (*)     All other components within normal limits    Narrative:     In absence of clinical symptoms, the presence of pyuria, bacteria, and/or nitrites on the urinalysis result does not correlate with infection.   URINALYSIS, MICROSCOPIC ONLY - Abnormal; Notable for the following components:    RBC, UA 6-10 (*)     WBC, UA 21-50 (*)     Bacteria, UA 4+ (*)     All other components within normal limits   SINGLE HS TROPONIN T - Normal    Narrative:     High Sensitive Troponin T Reference Range:  <14.0 ng/L- Negative Female for AMI  <22.0 ng/L- Negative Male for AMI  >=14 - Abnormal Female indicating possible myocardial injury.  >=22 - Abnormal Male indicating possible myocardial injury.   Clinicians would have to utilize clinical acumen, EKG, Troponin, and serial changes to determine if it is an Acute Myocardial Infarction or myocardial injury due to an underlying chronic condition.        LACTIC ACID, PLASMA - Normal   POC LACTATE - Normal   URINE CULTURE   BLOOD CULTURE   BLOOD CULTURE   RAINBOW DRAW    Narrative:     The following orders were created for panel order [place] Draw.  Procedure                               Abnormality         Status                     ---------                               -----------         ------                     Green Top (Gel)[[id number]]                                  Final result               Lavender Top[[id number]]                                     Final result               Gold Top - SST[[id number]]                                   Final result               Light Blue Top[[id number]]                                    Final result                 Please view results for these tests on the individual orders.   CBC AND DIFFERENTIAL    Narrative:     The following orders were created for panel order CBC & Differential.  Procedure                               Abnormality         Status                     ---------                               -----------         ------                     CBC Auto Differential[[id number]]        Abnormal            Final result                 Please view results for these tests on the individual orders.   GREEN TOP   LAVENDER TOP   GOLD TOP - RUST   LIGHT BLUE TOP       LOC: Person, Place, Time, and Situation    Telemetry:  Med/Surg    Cardiac Monitoring Ordered: yes    EKG:   ECG 12 Lead Syncope   Preliminary Result   HEART RATE= 87  bpm   RR Interval= 692  ms   [place] Interval= 159  ms   P Horizontal Axis=   deg   P Front Axis= 32  deg   QRSD Interval= 97  ms   QT Interval= 372  ms   QTcB= 447  ms   QRS Axis= 3  deg   T Wave Axis= 42  deg   - NORMAL ECG -   Sinus rhythm   When compared with ECG of 16-Mar-2022 11:48:20,   Nonspecific significant change   Electronically Signed By:    Date and Time of Study: 2024-05-20 11:10:31          Medications Given in the ED:   Medications   sodium chloride 0.9 % flush 10 mL (has no administration in time range)   tamsulosin (FLOMAX) 24 hr capsule 0.4 mg (0.4 mg Oral Not Given 5/20/24 1618)   sodium chloride 0.9 % flush 10 mL (has no administration in time range)   sodium chloride 0.9 % flush 10 mL (has no administration in time range)   sodium chloride 0.9 % infusion 40 mL (has no administration in time range)   Pharmacy to Dose enoxaparin (LOVENOX) (has no administration in time range)   sodium chloride 0.9 % infusion (100 mL/hr Intravenous New Bag 5/20/24 1608)   Potassium Replacement - Follow Nurse / BPA Driven Protocol (has no administration in time range)   Magnesium Low Dose Replacement - Follow Nurse / BPA Driven Protocol (has no administration  in time range)   Phosphorus Replacement - Follow Nurse / BPA Driven Protocol (has no administration in time range)   Calcium Replacement - Follow Nurse / BPA Driven Protocol (has no administration in time range)   sennosides-docusate (PERICOLACE) 8.6-50 MG per tablet 2 tablet (has no administration in time range)     And   polyethylene glycol (MIRALAX) packet 17 g (has no administration in time range)     And   bisacodyl (DULCOLAX) EC tablet 5 mg (has no administration in time range)     And   bisacodyl (DULCOLAX) suppository 10 mg (has no administration in time range)   cefTRIAXone (ROCEPHIN) 1,000 mg in sodium chloride 0.9 % 100 mL MBP (has no administration in time range)   HYDROcodone-acetaminophen (NORCO) 5-325 MG per tablet 1 tablet (1 tablet Oral Not Given 5/20/24 1443)   morphine injection 2 mg (has no administration in time range)   ondansetron (ZOFRAN) injection 4 mg (has no administration in time range)   Enoxaparin Sodium (LOVENOX) syringe 40 mg (40 mg Subcutaneous Given 5/20/24 1631)   cefTRIAXone (ROCEPHIN) 1,000 mg in sodium chloride 0.9 % 100 mL MBP (0 mg Intravenous Stopped 5/20/24 1443)       Imaging results:  XR Spine Thoracic 3 View    Result Date: 5/20/2024  Impression: 1. No acute findings within the thoracic or lumbar spine. 2. Advanced L4-5 and moderate L5-S1 diminished disc space height, along with anterior and posterior osteophyte formation. 3. Moderate anterolateral bridging osteophyte formation is demonstrated within the mid to lower thoracic spine. Electronically Signed: Lita Watters MD  5/20/2024 11:09 AM EDT  Workstation ID: [id number]    XR Spine Lumbar Complete 4+VW    Result Date: 5/20/2024  Impression: 1. No acute findings within the thoracic or lumbar spine. 2. Advanced L4-5 and moderate L5-S1 diminished disc space height, along with anterior and posterior osteophyte formation. 3. Moderate anterolateral bridging osteophyte formation is demonstrated within the mid to lower thoracic  spine. Electronically Signed: Lita Watters MD  5/20/2024 11:09 AM EDT  Workstation ID: [id number]     Social issues:   Social History     Socioeconomic History    Marital status:        NIH Stroke Scale:  Interval: (not recorded)  1a. Level of Consciousness: (not recorded)  1b. LOC Questions: (not recorded)  1c. LOC Commands: (not recorded)  2. Best Gaze: (not recorded)  3. Visual: (not recorded)  4. Facial Palsy: (not recorded)  5a. Motor Arm, Left: (not recorded)  5b. Motor Arm, Right: (not recorded)  6a. Motor Leg, Left: (not recorded)  6b. Motor Leg, Right: (not recorded)  7. Limb Ataxia: (not recorded)  8. Sensory: (not recorded)  9. Best Language: (not recorded)  10. Dysarthria: (not recorded)  11. Extinction and Inattention (formerly Neglect): (not recorded)    Total (NIH Stroke Scale): (not recorded)     Additional notable assessment information:     Nursing report ED to floor:  Sindy zuniga 3C    Chelsea Jarrett RN   05/20/24 17:24 EDT

## 2024-05-20 NOTE — H&P
[facility]   HISTORY AND PHYSICAL    Patient Name: Almas Joseph  : 1945  MRN: [id number]  Primary Care Physician:  Rose Mary, No Known  Date of admission: 2024  Service Date and time: 24 14:17 EDT  Subjective   Subjective     Chief Complaint: pre-syncope    HPI:    Almas Joseph is a 79 y.o. male past medical history significant for hypertension, hyperlipidemia, diabetes mellitus, history of chronic back pain, who presented to the hospital earlier today after he passed out apparently while he was sitting, apparently felt nauseated the whole night.  He does complain of mid back pain, patient had a history of DISH syndrome, he denied any fever any chills he did have some mild burning sensation upon urination, patient was seen in ED where imaging studies including cervical and thoracic spine x-rays were done and showed no acute fracture but patient did have a urinary tract infection hence he received IV antibiotics and decision was made to admit him for further evaluation management.    Review of Systems   All systems were reviewed and negative except for: HPI    Personal History     No past medical history on file.    No past surgical history on file.    Family History: family history is not on file. Otherwise pertinent FHx was reviewed and not pertinent to current issue.    Social History:      Home Medications:  Menaquinone-7, amLODIPine, atorvastatin, cholecalciferol, losartan, magnesium oxide, and tamsulosin      Allergies:  Allergies   Allergen Reactions    Bactrim [Sulfamethoxazole-Trimethoprim] Hallucinations    Ciprofloxacin Myalgia    Nsaids Other (See Comments)     Had blood pressure issues when taking       Objective   Objective     Vitals:   Temp:  [97.9 °F (36.6 °C)] 97.9 °F (36.6 °C)  Heart Rate:  [] 103  Resp:  [16] 16  BP: (137-159)/(65-81) 159/81  Physical Exam    Constitutional: Awake, alert in no distress    Eyes: PERRLA, sclerae anicteric, no conjunctival  injection   HENT: NCAT, mucous membranes moist   Neck: Supple, no thyromegaly, no lymphadenopathy, trachea midline   Respiratory: Clear to auscultation bilaterally, nonlabored respirations    Cardiovascular: RRR, no murmurs, rubs, or gallops, palpable pedal pulses bilaterally   Gastrointestinal: Positive bowel sounds, soft, nontender, nondistended   Musculoskeletal: No bilateral ankle edema, no clubbing or cyanosis to extremities   Psychiatric: Appropriate affect, cooperative   Neurologic: Oriented x 3, strength symmetric in all extremities, Cranial Nerves grossly intact to confrontation, speech clear   Skin: No rashes     Result Review    Result Review:  I have personally reviewed the results from the time of this admission to 5/20/2024 14:17 EDT and agree with these findings:  [x]  Laboratory list / accordion  []  Microbiology  [x]  Radiology  []  EKG/Telemetry   []  Cardiology/Vascular   []  Pathology  []  Old records  []  Other:  Most notable findings include: Urinalysis hazy appearance ketones 15 mg/dL, moderate blood in the urine, protein urine 30 mg/dL, leukocyte esterase moderate, positive nitrite, glucose 180, creatinine kinase 267, WBC count 19.6, hemoglobin 13.4, hematocrit 42.5, platelet count 191           Thoracic spine and lumbar spine showed    1. No acute findings within the thoracic or lumbar spine.   2. Advanced L4-5 and moderate L5-S1 diminished disc space height, along with anterior and posterior osteophyte formation.   3. Moderate anterolateral bridging osteophyte formation is demonstrated within the mid to lower thoracic spine.     Assessment & Plan   Assessment / Plan       Active Hospital Problems:  Active Hospital Problems    Diagnosis     **Pre-syncope      Assessment:   This is an 79-year-old gentleman who presented to the hospital with  1.  Presyncope  2.  Status post fall  3.  Acute cystitis without hematuria  4.  History of diabetes mellitus type 2      Plan: The patient will be kept  in observation, he will be started on IV fluids and normal saline, patient will be started on IV Rocephin, will start pain medications with Tylenol, will order x-ray of restage.  Resume home medications.    DVT prophylaxis:  Medical DVT prophylaxis orders are signed and held.          CODE STATUS:    Level Of Support Discussed With: Patient  Code Status (Patient has no pulse and is not breathing): CPR (Attempt to Resuscitate)  Medical Interventions (Patient has pulse or is breathing): Full Support    Admission Status:  I believe this patient meets observation status.    Audi Bergeron MD

## 2024-05-20 NOTE — ED NOTES
Pt reports dysuria and diarrhea x2 days. This AM pt got dizzy and fell from the seated position. Positive LOC, no blood thinners, and did not hit his head. Pt reports low back to mid back pain. Pt takes BP medication.

## 2024-05-21 ENCOUNTER — APPOINTMENT (OUTPATIENT)
Dept: CT IMAGING | Facility: HOSPITAL | Age: 79
End: 2024-05-21
Payer: MEDICARE

## 2024-05-21 LAB
ANION GAP SERPL CALCULATED.3IONS-SCNC: 12 MMOL/L (ref 5–15)
BACTERIA BLD CULT: ABNORMAL
BASOPHILS # BLD AUTO: 0.04 10*3/MM3 (ref 0–0.2)
BASOPHILS NFR BLD AUTO: 0.2 % (ref 0–1.5)
BOTTLE TYPE: ABNORMAL
BUN SERPL-MCNC: 13 MG/DL (ref 8–23)
BUN/CREAT SERPL: 14.6 (ref 7–25)
CALCIUM SPEC-SCNC: 8.7 MG/DL (ref 8.6–10.5)
CHLORIDE SERPL-SCNC: 103 MMOL/L (ref 98–107)
CO2 SERPL-SCNC: 23 MMOL/L (ref 22–29)
CREAT SERPL-MCNC: 0.89 MG/DL (ref 0.76–1.27)
D-LACTATE SERPL-SCNC: 1.4 MMOL/L (ref 0.5–2)
D-LACTATE SERPL-SCNC: 2.4 MMOL/L (ref 0.5–2)
DEPRECATED RDW RBC AUTO: 45.7 FL (ref 37–54)
EGFRCR SERPLBLD CKD-EPI 2021: 86.1 ML/MIN/1.73
EOSINOPHIL # BLD AUTO: 0 10*3/MM3 (ref 0–0.4)
EOSINOPHIL NFR BLD AUTO: 0 % (ref 0.3–6.2)
ERYTHROCYTE [DISTWIDTH] IN BLOOD BY AUTOMATED COUNT: 15 % (ref 12.3–15.4)
GLUCOSE SERPL-MCNC: 169 MG/DL (ref 65–99)
HCT VFR BLD AUTO: 38.7 % (ref 37.5–51)
HGB BLD-MCNC: 12.4 G/DL (ref 13–17.7)
IMM GRANULOCYTES # BLD AUTO: 0.17 10*3/MM3 (ref 0–0.05)
IMM GRANULOCYTES NFR BLD AUTO: 0.9 % (ref 0–0.5)
LYMPHOCYTES # BLD AUTO: 1.9 10*3/MM3 (ref 0.7–3.1)
LYMPHOCYTES NFR BLD AUTO: 10 % (ref 19.6–45.3)
MCH RBC QN AUTO: 26.9 PG (ref 26.6–33)
MCHC RBC AUTO-ENTMCNC: 32 G/DL (ref 31.5–35.7)
MCV RBC AUTO: 83.9 FL (ref 79–97)
MONOCYTES # BLD AUTO: 1.42 10*3/MM3 (ref 0.1–0.9)
MONOCYTES NFR BLD AUTO: 7.4 % (ref 5–12)
NEUTROPHILS NFR BLD AUTO: 15.56 10*3/MM3 (ref 1.7–7)
NEUTROPHILS NFR BLD AUTO: 81.5 % (ref 42.7–76)
NRBC BLD AUTO-RTO: 0 /100 WBC (ref 0–0.2)
PLATELET # BLD AUTO: 164 10*3/MM3 (ref 140–450)
PMV BLD AUTO: 9.6 FL (ref 6–12)
POTASSIUM SERPL-SCNC: 3.5 MMOL/L (ref 3.5–5.2)
POTASSIUM SERPL-SCNC: 4.1 MMOL/L (ref 3.5–5.2)
QT INTERVAL: 372 MS
QTC INTERVAL: 447 MS
RBC # BLD AUTO: 4.61 10*6/MM3 (ref 4.14–5.8)
SODIUM SERPL-SCNC: 138 MMOL/L (ref 136–145)
WBC NRBC COR # BLD AUTO: 19.09 10*3/MM3 (ref 3.4–10.8)

## 2024-05-21 PROCEDURE — 84132 ASSAY OF SERUM POTASSIUM: CPT | Performed by: FAMILY MEDICINE

## 2024-05-21 PROCEDURE — 80048 BASIC METABOLIC PNL TOTAL CA: CPT | Performed by: INTERNAL MEDICINE

## 2024-05-21 PROCEDURE — 25010000002 MORPHINE PER 10 MG: Performed by: INTERNAL MEDICINE

## 2024-05-21 PROCEDURE — 25010000002 CEFTRIAXONE PER 250 MG: Performed by: NURSE PRACTITIONER

## 2024-05-21 PROCEDURE — 71250 CT THORAX DX C-: CPT

## 2024-05-21 PROCEDURE — 25010000002 ENOXAPARIN PER 10 MG: Performed by: INTERNAL MEDICINE

## 2024-05-21 PROCEDURE — 25810000003 SODIUM CHLORIDE 0.9 % SOLUTION: Performed by: INTERNAL MEDICINE

## 2024-05-21 PROCEDURE — 97165 OT EVAL LOW COMPLEX 30 MIN: CPT

## 2024-05-21 PROCEDURE — 85025 COMPLETE CBC W/AUTO DIFF WBC: CPT | Performed by: INTERNAL MEDICINE

## 2024-05-21 PROCEDURE — 83605 ASSAY OF LACTIC ACID: CPT

## 2024-05-21 PROCEDURE — 74176 CT ABD & PELVIS W/O CONTRAST: CPT

## 2024-05-21 PROCEDURE — 97535 SELF CARE MNGMENT TRAINING: CPT

## 2024-05-21 PROCEDURE — 97162 PT EVAL MOD COMPLEX 30 MIN: CPT

## 2024-05-21 RX ORDER — AMLODIPINE BESYLATE 2.5 MG/1
2.5 TABLET ORAL NIGHTLY
Status: DISCONTINUED | OUTPATIENT
Start: 2024-05-21 | End: 2024-05-23 | Stop reason: HOSPADM

## 2024-05-21 RX ORDER — POTASSIUM CHLORIDE 20 MEQ/1
40 TABLET, EXTENDED RELEASE ORAL EVERY 4 HOURS
Status: DISPENSED | OUTPATIENT
Start: 2024-05-21 | End: 2024-05-21

## 2024-05-21 RX ORDER — LOSARTAN POTASSIUM 25 MG/1
25 TABLET ORAL NIGHTLY
Status: DISCONTINUED | OUTPATIENT
Start: 2024-05-21 | End: 2024-05-23 | Stop reason: HOSPADM

## 2024-05-21 RX ORDER — ATORVASTATIN CALCIUM 20 MG/1
20 TABLET, FILM COATED ORAL NIGHTLY
Status: DISCONTINUED | OUTPATIENT
Start: 2024-05-21 | End: 2024-05-23 | Stop reason: HOSPADM

## 2024-05-21 RX ADMIN — ACETAMINOPHEN 650 MG: 325 TABLET, FILM COATED ORAL at 15:18

## 2024-05-21 RX ADMIN — AMLODIPINE BESYLATE 2.5 MG: 2.5 TABLET ORAL at 20:48

## 2024-05-21 RX ADMIN — Medication 10 ML: at 09:57

## 2024-05-21 RX ADMIN — ATORVASTATIN CALCIUM 20 MG: 20 TABLET, FILM COATED ORAL at 20:48

## 2024-05-21 RX ADMIN — MORPHINE SULFATE 2 MG: 2 INJECTION, SOLUTION INTRAMUSCULAR; INTRAVENOUS at 11:41

## 2024-05-21 RX ADMIN — TAMSULOSIN HYDROCHLORIDE 0.4 MG: 0.4 CAPSULE ORAL at 09:56

## 2024-05-21 RX ADMIN — MORPHINE SULFATE 2 MG: 2 INJECTION, SOLUTION INTRAMUSCULAR; INTRAVENOUS at 20:52

## 2024-05-21 RX ADMIN — CEFTRIAXONE 2000 MG: 2 INJECTION, POWDER, FOR SOLUTION INTRAMUSCULAR; INTRAVENOUS at 14:43

## 2024-05-21 RX ADMIN — SODIUM CHLORIDE 100 ML/HR: 9 INJECTION, SOLUTION INTRAVENOUS at 20:52

## 2024-05-21 RX ADMIN — MORPHINE SULFATE 2 MG: 2 INJECTION, SOLUTION INTRAMUSCULAR; INTRAVENOUS at 05:53

## 2024-05-21 RX ADMIN — ENOXAPARIN SODIUM 40 MG: 100 INJECTION SUBCUTANEOUS at 16:40

## 2024-05-21 NOTE — THERAPY EVALUATION
Patient Name: Almas Joseph  : 1945    MRN: [id number]                              Today's Date: 2024       Admit Date: 2024    Visit Dx:     ICD-10-CM ICD-9-CM   1. Urinary tract infection with hematuria, site unspecified  N39.0 599.0    R31.9 599.70   2. Sepsis, due to unspecified organism, unspecified whether acute organ dysfunction present  A41.9 038.9     995.91   3. Syncope, unspecified syncope type  R55 780.2     Patient Active Problem List   Diagnosis    Pre-syncope     Past Medical History:   Diagnosis Date    Chronic pain     Diabetes mellitus     Hyperlipidemia     Hypertension      Past Surgical History:   Procedure Laterality Date    BACK SURGERY      GALLBLADDER SURGERY      HERNIA REPAIR        General Information       Row Name 24 1535          OT Time and Intention    Document Type evaluation  -MP     Mode of Treatment individual therapy;occupational therapy  -MP       Row Name 24 1535          General Information    Patient Profile Reviewed yes  -MP     Existing Precautions/Restrictions fall  -MP       Row Name 24 1535          Living Environment    People in Home spouse  -MP       Row Name 24 1535          Cognition    Orientation Status (Cognition) oriented x 4  -MP       Row Name 24 1535          Safety Issues, Functional Mobility    Impairments Affecting Function (Mobility) balance;endurance/activity tolerance;pain;strength  -MP               User Key  (r) = Recorded By, (t) = Taken By, (c) = Cosigned By      Initials Name Provider Type    MP Armando Sandoval OT Occupational Therapist                     Mobility/ADL's       Row Name 24 1535          Bed Mobility    Bed Mobility bed mobility (all) activities  -MP     All Activities, [place] (Bed Mobility) moderate assist (50% patient effort);2 person assist  -MP     Assistive Device (Bed Mobility) bed rails  -MP       Row Name 24 1535          Sit-Stand Transfer     Sit-Stand [place] (Transfers) contact guard;minimum assist (75% patient effort);1 person assist  -MP     Assistive Device (Sit-Stand Transfers) walker, front-wheeled  -MP       Row Name 05/21/24 1535          Activities of Daily Living    BADL Assessment/Intervention lower body dressing  -MP       [facility] Name 05/21/24 1535          Lower Body Dressing Assessment/Training    [place] Level (Lower Body Dressing) lower body dressing skills;maximum assist (25% patient effort);don;doff;pants/bottoms  -MP               User Key  (r) = Recorded By, (t) = Taken By, (c) = Cosigned By      Initials Name Provider Type    Armando Curtis OT Occupational Therapist                   Obj/Interventions       Row Name 05/21/24 1535          Range of Motion Comprehensive    Comment, General Range of Motion B shoulder AROM impacted 25-50%  -[facility] Name 05/21/24 1535          Strength Comprehensive (MMT)    Comment, General Manual Muscle Testing (MMT) Assessment BUE 4-/5  -MP       [facility] Name 05/21/24 1535          Balance    Balance Interventions sitting;sit to stand;supported;static;dynamic;standing  -MP               User Key  (r) = Recorded By, (t) = Taken By, (c) = Cosigned By      Initials Name Provider Type    Armando Curtis OT Occupational Therapist                   Goals/Plan       [facility] Name 05/21/24 1543          Bed Mobility Goal 1 (OT)    Activity/Assistive Device (Bed Mobility Goal 1, OT) bed mobility activities, all  -MP     [place] Level/Cues Needed (Bed Mobility Goal 1, OT) minimum assist (75% or more patient effort)  -MP     Time Frame (Bed Mobility Goal 1, OT) long term goal (LTG);2 weeks  -MP       Row Name 05/21/24 1543          Transfer Goal 1 (OT)    Activity/Assistive Device (Transfer Goal 1, OT) sit-to-stand/stand-to-sit;toilet  -MP     [place] Level/Cues Needed (Transfer Goal 1, OT) contact guard required  -MP     Time Frame (Transfer Goal 1, OT) long term goal (LTG);2 weeks   -MP       Row Name 05/21/24 1543          Toileting Goal 1 (OT)    Activity/Device (Toileting Goal 1, OT) toileting skills, all  -MP     [place] Level/Cues Needed (Toileting Goal 1, OT) supervision required;set-up required  -MP     Time Frame (Toileting Goal 1, OT) long term goal (LTG);2 weeks  -MP               User Key  (r) = Recorded By, (t) = Taken By, (c) = Cosigned By      Initials Name Provider Type    MP Armando Sandoval, PAVAN Occupational Therapist                   Clinical Impression       Row Name 05/21/24 1536          Pain Assessment    Pretreatment Pain Rating 8/10  -MP     Posttreatment Pain Rating 5/10  -MP     Pain Location lower  -MP     Pain Location - back  -MP       Row Name 05/21/24 6566          Plan of Care Review    Plan of Care Reviewed With patient  -MP     Progress no change  -MP     Outcome Evaluation Pt is a 80 y/o male with hx of chronic back pain, DISH syndrome and multiple back sx. Pt wtih syncopal episode at home with (+) LOC. (+) UTI. Thoracic spine X-ray: (-). Lumbar Spine X-ray: (-). Pt. lives at home w/ spouse and ambulates w/ cane/walker support, spouse provides ADL support/assist PRN secondary to limited ROM. Pt. reports severe pain supine in bed, decreased 5/10 w/ sitting activity EOB, mod A x 2 for bed mobility. Provided min A for SPS transfer EOB to armchair, increased max A for donning/doffing shorts, patient incontinent of urine and provided assist for posterior hygiene. Pt. not safe to d/c home at this time and will require SNF placement at d/c to address aforementioned deficits pending progress.  -MP       Row Name 05/21/24 1766          Therapy Assessment/Plan (OT)    Rehab Potential (OT) good, to achieve stated therapy goals  -MP     Criteria for Skilled Therapeutic Interventions Met (OT) yes;skilled treatment is necessary;meets criteria  -MP     Therapy Frequency (OT) 3 times/wk  -MP       Row Name 05/21/24 0846          Therapy Plan Review/Discharge Plan (OT)     Anticipated Discharge Disposition (OT) skilled nursing facility  -       Row Name 05/21/24 1536          Vital Signs    Pre Patient Position Supine  -MP     Intra Patient Position Standing  -MP     Post Patient Position Sitting  -MP       Row Name 05/21/24 1536          Positioning and Restraints    Pre-Treatment Position in bed  -MP     Post Treatment Position chair  -MP     In Chair sitting;call light within reach;encouraged to call for assist;exit alarm on  -MP               User Key  (r) = Recorded By, (t) = Taken By, (c) = Cosigned By      Initials Name Provider Type    Armando Curtis OT Occupational Therapist                   Outcome Measures       Row Name 05/21/24 1423 05/21/24 0800       How much help from another person do you currently need...    Turning from your back to your side while in flat bed without using bedrails? 2  -AM 3  -RH (r) CA (t) RH (c)    Moving from lying on back to sitting on the side of a flat bed without bedrails? 2  -AM 3  -RH (r) CA (t) RH (c)    Moving to and from a bed to a chair (including a wheelchair)? 3  -AM 2  -RH (r) CA (t) RH (c)    Standing up from a chair using your arms (e.g., wheelchair, bedside chair)? 3  -AM 2  -RH (r) CA (t) RH (c)    Climbing 3-5 steps with a railing? 1  -AM 2  -RH (r) CA (t) RH (c)    To walk in hospital room? 3  -AM 2  -RH (r) CA (t) RH (c)    AM-PAC 6 Clicks Score (PT) 14  -AM 14  -CA    Highest Level of Mobility Goal 4 --> Transfer to chair/commode  -AM 4 --> Transfer to chair/commode  -CA              User Key  (r) = Recorded By, (t) = Taken By, (c) = Cosigned By      Initials Name Provider Type    Corry Jules, RN Registered Nurse    Ramsey Patterson, PT Physical Therapist    Laurie Castillo LPN Licensed Nurse                    Occupational Therapy Education       Title: PT OT SLP Therapies (In Progress)       Topic: Occupational Therapy (In Progress)       Point: ADL training (Not Started)       Description:    Instruct learner(s) on proper safety adaptation and remediation techniques during self care or transfers.   Instruct in proper use of assistive devices.                  Learner Progress:  Not documented in this visit.              Point: Home exercise program (Not Started)       Description:   Instruct learner(s) on appropriate technique for monitoring, assisting and/or progressing therapeutic exercises/activities.                  Learner Progress:  Not documented in this visit.              Point: Precautions (Not Started)       Description:   Instruct learner(s) on prescribed precautions during self-care and functional transfers.                  Learner Progress:  Not documented in this visit.              Point: Body mechanics (Done)       Description:   Instruct learner(s) on proper positioning and spine alignment during self-care, functional mobility activities and/or exercises.                  Learning Progress Summary             Patient Acceptance, E,TB, VU by DANNY at 5/21/2024 1544                                         User Key       Initials Effective Dates Name Provider Type Discipline    DANNY 06/16/21 -  Armando Sandoval OT Occupational Therapist OT                  OT Recommendation and Plan  Therapy Frequency (OT): 3 times/wk  Plan of Care Review  Plan of Care Reviewed With: patient  Progress: no change  Outcome Evaluation: Pt is a 80 y/o male with hx of chronic back pain, DISH syndrome and multiple back sx. Pt wtih syncopal episode at home with (+) LOC. (+) UTI. Thoracic spine X-ray: (-). Lumbar Spine X-ray: (-). Pt. lives at home w/ spouse and ambulates w/ cane/walker support, spouse provides ADL support/assist PRN secondary to limited ROM. Pt. reports severe pain supine in bed, decreased 5/10 w/ sitting activity EOB, mod A x 2 for bed mobility. Provided min A for SPS transfer EOB to armchair, increased max A for donning/doffing shorts, patient incontinent of urine and provided assist for  posterior hygiene. Pt. not safe to d/c home at this time and will require SNF placement at d/c to address aforementioned deficits pending progress.     Time Calculation:         Time Calculation- OT       Row Name 05/21/24 1544             Time Calculation- OT    OT Start Time 1016  -MP      OT Stop Time 1051  -      OT Time Calculation (min) 35 min  -      Total Timed Code Minutes- OT 8 minute(s)  -      OT Received On 05/21/24  -      OT - Next Appointment 05/23/24  -      OT Goal Re-Cert Due Date 06/04/24  -                User Key  (r) = Recorded By, (t) = Taken By, (c) = Cosigned By      Initials Name Provider Type    MP Armando Sandoval OT Occupational Therapist                  Therapy Charges for Today       Code Description Service Date Service Provider Modifiers Qty    [id number]  OT EVAL LOW COMPLEXITY 4 5/21/2024 Armando Sandoval OT GO 1    [id number]  OT SELF CARE/MGMT/TRAIN EA 15 MIN 5/21/2024 Armando Sandoval OT GO 1                 Armando Sandoval OT  5/21/2024

## 2024-05-21 NOTE — PROGRESS NOTES
[facility] MEDICINE SERVICE  DAILY PROGRESS NOTE    NAME: Almas Joseph  : 1945  MRN: [id number]      LOS: 0 days     PROVIDER OF SERVICE: MICHELE Rivers    Chief Complaint: Pre-syncope    Subjective:     Interval History:  History taken from: patient  Patient Complaints: back pain  Patient Denies:  SOB, CP, dizziness, HA, chills, fever    Review of Systems:   Review of Systems   Constitutional:  Negative for chills and fever.   Respiratory:  Negative for shortness of breath.    Cardiovascular:  Negative for chest pain.   Musculoskeletal:  Positive for back pain.   Neurological:  Negative for dizziness and headaches.       Objective:     Vital Signs  Temp:  [98 °F (36.7 °C)-102.7 °F (39.3 °C)] 100.7 °F (38.2 °C)  Heart Rate:  [] 95  Resp:  [12-20] 12  BP: (125-168)/(56-86) 128/59   Body mass index is 28.35 kg/m².    Physical Exam  Physical Exam  Constitutional:       Appearance: Normal appearance. He is not diaphoretic.   HENT:      Head: Normocephalic and atraumatic.      Nose: Nose normal.      Mouth/Throat:      Mouth: Mucous membranes are moist.      Pharynx: Oropharynx is clear.   Eyes:      Extraocular Movements: Extraocular movements intact.      Conjunctiva/sclera: Conjunctivae normal.      Pupils: Pupils are equal, round, and reactive to light.   Cardiovascular:      Rate and Rhythm: Normal rate and regular rhythm.      Pulses: Normal pulses.   Pulmonary:      Effort: Pulmonary effort is normal.   Abdominal:      General: Abdomen is flat.      Palpations: Abdomen is soft.   Musculoskeletal:         General: Normal range of motion.      Cervical back: Normal range of motion and neck supple.   Skin:     General: Skin is warm and dry.   Neurological:      General: No focal deficit present.      Mental Status: He is alert and oriented to person, place, and time. Mental status is at baseline.   Psychiatric:         Mood and Affect: Mood normal.         Behavior: Behavior  normal.         Thought Content: Thought content normal.         Judgment: Judgment normal.         Scheduled Meds   cefTRIAXone, 2,000 mg, Intravenous, Q24H  enoxaparin, 40 mg, Subcutaneous, Daily  potassium chloride ER, 40 mEq, Oral, Q4H  sodium chloride, 10 mL, Intravenous, Q12H  tamsulosin, 0.4 mg, Oral, Daily       PRN Meds     acetaminophen    senna-docusate sodium **AND** polyethylene glycol **AND** bisacodyl **AND** bisacodyl    Calcium Replacement - Follow Nurse / BPA Driven Protocol    HYDROcodone-acetaminophen    Magnesium Low Dose Replacement - Follow Nurse / BPA Driven Protocol    Morphine    ondansetron    Pharmacy to Dose enoxaparin (LOVENOX)    Phosphorus Replacement - Follow Nurse / BPA Driven Protocol    Potassium Replacement - Follow Nurse / BPA Driven Protocol    sodium chloride    sodium chloride    sodium chloride   Infusions  Pharmacy to Dose enoxaparin (LOVENOX),   sodium chloride, 100 mL/hr, Last Rate: 100 mL/hr (05/21/24 0033)          Diagnostic Data    Results from last 7 days   Lab Units 05/21/24  0424 05/20/24  1033   WBC 10*3/mm3 19.09* 19.60*   HEMOGLOBIN g/dL 12.4* 13.4   HEMATOCRIT % 38.7 42.5   PLATELETS 10*3/mm3 164 191   GLUCOSE mg/dL 169* 180*   CREATININE mg/dL 0.89 0.98   BUN mg/dL 13 16   SODIUM mmol/L 138 138   POTASSIUM mmol/L 3.5 4.1   AST (SGOT) U/L  --  23   ALT (SGPT) U/L  --  20   ALK PHOS U/L  --  86   BILIRUBIN mg/dL  --  0.7   ANION GAP mmol/L 12.0 13.0       CT Abdomen Pelvis Without Contrast    Result Date: 5/21/2024  Impression: 1. Mild subsegmental atelectasis is present in both lungs. No dense lung consolidation. Trace bibasilar pleural fluid. 2. Coronary artery calcifications. Correlate with cardiac history. 3. No acute osseous abnormality. Electronically Signed: Lita Watters MD  5/21/2024 12:41 PM EDT  Workstation ID: [id number]    CT Chest Without Contrast Diagnostic    Result Date: 5/21/2024  Impression: 1. Mild subsegmental atelectasis is present in both  lungs. No dense lung consolidation. Trace bibasilar pleural fluid. 2. Coronary artery calcifications. Correlate with cardiac history. 3. No acute osseous abnormality. Electronically Signed: Lita Watters MD  5/21/2024 12:41 PM EDT  Workstation ID: [id number]    XR Spine Thoracic 3 View    Result Date: 5/20/2024  Impression: 1. No acute findings within the thoracic or lumbar spine. 2. Advanced L4-5 and moderate L5-S1 diminished disc space height, along with anterior and posterior osteophyte formation. 3. Moderate anterolateral bridging osteophyte formation is demonstrated within the mid to lower thoracic spine. Electronically Signed: Lita Watters MD  5/20/2024 11:09 AM EDT  Workstation ID: [id number]    XR Spine Lumbar Complete 4+VW    Result Date: 5/20/2024  Impression: 1. No acute findings within the thoracic or lumbar spine. 2. Advanced L4-5 and moderate L5-S1 diminished disc space height, along with anterior and posterior osteophyte formation. 3. Moderate anterolateral bridging osteophyte formation is demonstrated within the mid to lower thoracic spine. Electronically Signed: Lita Watters MD  5/20/2024 11:09 AM EDT  Workstation ID: [id number]       I reviewed the patient's new clinical results.    Assessment/Plan:     Active and Resolved Problems  Active Hospital Problems    Diagnosis  POA    **Pre-syncope [R55]  Yes      Resolved Hospital Problems   No resolved problems to display.       Presyncope  Status post fall  Chronic back pain  Acute cystitis without hematuria  Bacteremia  Sepsis triggered by elevated temperature and leukocytosis along with known infection  History of diabetes mellitus type 2    Patient was admitted after arriving to the ED with complaints of syncopal episode and falling.  Patient underwent x-rays which showed no acute findings.  Blood cultures were obtained which are positive for Klebsiella pneumoniae, infectious disease has been consulted, appreciate recommendations.  Patient does have  a urinary tract infection, urine culture pending however preliminary result shows positive for gram-negative bacilli.  Patient WBC 19.09.  CT abdomen ordered and shows mild subsegmental atelectasis present in both lungs, no dense lung consolidation.  Trace bibasilar pleural fluid.  Coronary artery calcifications and no acute osseous abnormality.    Patient has an extensive history regarding urinary tract.  Reports recurrent UTIs.  States he was on Flomax in the past however had difficulty getting a provider to fill it.  Urology has been consulted and will continue patient on Flomax at this time.  Continue IV fluids and further plan to come pending infectious disease consult.    Patient triggering sepsis, will obtain stat lactic acid at this time.  PT/OT needs to evaluate patient.    DVT prophylaxis:  Medical DVT prophylaxis orders are present.         Code status is   Code Status and Medical Interventions:   Ordered at: 05/20/24 1346     Level Of Support Discussed With:    Patient     Code Status (Patient has no pulse and is not breathing):    CPR (Attempt to Resuscitate)     Medical Interventions (Patient has pulse or is breathing):    Full Support       Plan for disposition: Pending course 5-    Time: 30 minutes    Signature: Electronically signed by MICHELE Rivers, 05/21/24, 13:37 EDT.  [facility] Hospitalist Team

## 2024-05-21 NOTE — THERAPY EVALUATION
Patient Name: Almas Joseph  : 1945    MRN: [id number]                              Today's Date: 2024       Admit Date: 2024    Visit Dx:     ICD-10-CM ICD-9-CM   1. Urinary tract infection with hematuria, site unspecified  N39.0 599.0    R31.9 599.70   2. Sepsis, due to unspecified organism, unspecified whether acute organ dysfunction present  A41.9 038.9     995.91   3. Syncope, unspecified syncope type  R55 780.2     Patient Active Problem List   Diagnosis    Pre-syncope     Past Medical History:   Diagnosis Date    Chronic pain     Diabetes mellitus     Hyperlipidemia     Hypertension      Past Surgical History:   Procedure Laterality Date    BACK SURGERY      GALLBLADDER SURGERY      HERNIA REPAIR        General Information       Row Name 24 1410          Physical Therapy Time and Intention    Document Type evaluation  -AM     Mode of Treatment physical therapy  -AM       Row Name 24 1410          General Information    Patient Profile Reviewed yes  -AM     Prior Level of Function independent:;all household mobility;community mobility;gait;transfer;bed mobility;using stairs  uses RW for sit to stand transfers.  Intermittently ambulates with RW but primarily uses no assistive device.  -AM     Existing Precautions/Restrictions fall  -AM     Barriers to Rehab medically complex  -AM       Row Name 24 1410          Living Environment    People in Home spouse  -AM       Row Name 24 1410          Home Main Entrance    Number of Stairs, Main Entrance two  -AM     Stair Railings, Main Entrance railings safe and in good condition  -AM       Row Name 24 1410          Stairs Within Home, Primary    Number of Stairs, Within Home, Primary none  -AM       Row Name 24 1410          Cognition    Orientation Status (Cognition) oriented x 4  -AM       Row Name 24 141          Safety Issues, Functional Mobility    Impairments Affecting Function (Mobility)  balance;endurance/activity tolerance;pain;strength  -AM     Comment, Safety Issues/Impairments (Mobility) gait belt utilized  -AM               User Key  (r) = Recorded By, (t) = Taken By, (c) = Cosigned By      Initials Name Provider Type    AM Ramsey Beasley, PT Physical Therapist                   Mobility       Row Name 05/21/24 1413          Bed Mobility    Bed Mobility bed mobility (all) activities  -AM     All Activities, [place] (Bed Mobility) moderate assist (50% patient effort);2 person assist  -AM     Assistive Device (Bed Mobility) bed rails  -AM     Comment, (Bed Mobility) via logroll technique  -AM       Row Name 05/21/24 1413          Sit-Stand Transfer    Sit-Stand [place] (Transfers) contact guard;minimum assist (75% patient effort);1 person assist  -AM     Assistive Device (Sit-Stand Transfers) walker, front-wheeled  -AM     Comment, (Sit-Stand Transfer) cues for hand placement  -AM       Row Name 05/21/24 1413          Gait/Stairs (Locomotion)    [place] Level (Gait) contact guard;1 person assist  -AM     Assistive Device (Gait) walker, front-wheeled  -AM     Distance in Feet (Gait) 5  -AM     Deviations/Abnormal Patterns (Gait) addison decreased;gait speed decreased  -AM     Bilateral Gait Deviations forward flexed posture  -AM     Comment, (Gait/Stairs) unable to ambulate further secondary to pt's worry that back pain would increase  -AM               User Key  (r) = Recorded By, (t) = Taken By, (c) = Cosigned By      Initials Name Provider Type    AM Ramsey Beasley PT Physical Therapist                   Obj/Interventions       Row Name 05/21/24 1415          Range of Motion Comprehensive    Comment, General Range of Motion limited cervical rotation.  -AM       Row Name 05/21/24 1415          Strength Comprehensive (MMT)    Comment, General Manual Muscle Testing (MMT) Assessment Unable to tolerate resistance for MMT secondary to back pain  -AM       Row Name 05/21/24 1415           Motor Skills    Motor Skills functional endurance  -AM     Functional Endurance fair -  -AM       Row Name 05/21/24 1415          Balance    Balance Assessment sitting static balance;sitting dynamic balance;sit to stand dynamic balance;standing static balance;standing dynamic balance  -AM     Static Sitting Balance supervision  -AM     Dynamic Sitting Balance supervision  -AM     Position, Sitting Balance unsupported;sitting edge of bed  -AM     Sit to Stand Dynamic Balance contact guard;minimal assist  -AM     Static Standing Balance contact guard  -AM     Dynamic Standing Balance contact guard  -AM     Position/Device Used, Standing Balance supported;walker, rolling  -AM       Row Name 05/21/24 1415          Sensory Assessment (Somatosensory)    Sensory Assessment (Somatosensory) sensation intact  -AM               User Key  (r) = Recorded By, (t) = Taken By, (c) = Cosigned By      Initials Name Provider Type    AM Ramsey Beasley, PT Physical Therapist                   Goals/Plan       Row Name 05/21/24 1422          Bed Mobility Goal 1 (PT)    Activity/Assistive Device (Bed Mobility Goal 1, PT) bed mobility activities, all  -AM     [place] Level/Cues Needed (Bed Mobility Goal 1, PT) modified independence  -AM     Time Frame (Bed Mobility Goal 1, PT) long term goal (LTG)  -AM     Strategies/Barriers (Bed Mobility Goal 1, PT) via logroll technique  -AM       Row Name 05/21/24 1422          Transfer Goal 1 (PT)    Activity/Assistive Device (Transfer Goal 1, PT) transfers, all;walker, rolling  -AM     [place] Level/Cues Needed (Transfer Goal 1, PT) modified independence  -AM     Time Frame (Transfer Goal 1, PT) long term goal (LTG)  -AM       Row Name 05/21/24 1422          Gait Training Goal 1 (PT)    Activity/Assistive Device (Gait Training Goal 1, PT) gait (walking locomotion);walker, rolling  -AM     [place] Level (Gait Training Goal 1, PT) modified independence  -AM     Distance (Gait Training  "Goal 1, PT) 150'  -AM     Time Frame (Gait Training Goal 1, PT) long term goal (LTG)  -AM       Row Name 05/21/24 1422          Stairs Goal 1 (PT)    Activity/Assistive Device (Stairs Goal 1, PT) stairs, all skills  -AM     [place] Level/Cues Needed (Stairs Goal 1, PT) contact guard required  -AM     Number of Stairs (Stairs Goal 1, PT) 2  -AM     Time Frame (Stairs Goal 1, PT) long term goal (LTG)  -AM       Row Name 05/21/24 1422          Therapy Assessment/Plan (PT)    Planned Therapy Interventions (PT) balance training;bed mobility training;gait training;strengthening;stair training;patient/family education;transfer training  -AM               User Key  (r) = Recorded By, (t) = Taken By, (c) = Cosigned By      Initials Name Provider Type    AM Ramsey Beasley, PT Physical Therapist                   Clinical Impression       Row Name 05/21/24 1416          Pain    Pretreatment Pain Rating 8/10  -AM     Posttreatment Pain Rating 5/10  -AM     Pain Location lower  -AM     Pain Location - back  -AM     Pain Intervention(s) Repositioned;Emotional support;Therapeutic presence  -AM       Row Name 05/21/24 1416          Plan of Care Review    Plan of Care Reviewed With patient;spouse  -AM     Outcome Evaluation Pt is a 78 y/o male with hx of chronic back pain, DISH syndrome and multiple back sx.  Pt wtih syncopal episode at home with (+) LOC.  (+) UTI.  Thoracic spine X-ray: (-).  Lumbar Spine X-ray: (-).  Pt reports he lives with his wife in a 1 story home with 2 steps to enter into home.  Pt utilizes a RW for all sit to stand transfers and intermittely uses RW for ambulation but \"usually I can walk without the RW\".  Pt on room air, IV and telemetry this date.  Pt very hesitant to mobilize this date secondary to fear of increased back pain.  Required Mod A x 2 for supine to sit via logroll technique.  Sit to stand with RW x 2 attempts: CGA/Min A.  Pt ambulated 5' with RW with CGA.  Pt with limited cervical " rotation and decreased endurance.  Recommend SNF secondary to heavy A of 2 for bed mobility this date.  -AM       Row Name 05/21/24 1416          Therapy Assessment/Plan (PT)    Patient/Family Therapy Goals Statement (PT) To get better  -AM     Rehab Potential (PT) good, to achieve stated therapy goals  -AM     Criteria for Skilled Interventions Met (PT) yes  -AM     Therapy Frequency (PT) 5 times/wk  -AM     Predicted Duration of Therapy Intervention (PT) until d/c  -AM       Row Name 05/21/24 1416          Vital Signs    O2 Delivery Pre Treatment room air  -AM     O2 Delivery Intra Treatment room air  -AM     O2 Delivery Post Treatment room air  -AM     Pre Patient Position Supine  -AM     Intra Patient Position Standing  -AM     Post Patient Position Sitting  -AM       Row Name 05/21/24 1416          Positioning and Restraints    Pre-Treatment Position in bed  -AM     Post Treatment Position chair  -AM     In Chair notified nsg;sitting;call light within reach;encouraged to call for assist;exit alarm on;with family/caregiver  -AM               User Key  (r) = Recorded By, (t) = Taken By, (c) = Cosigned By      Initials Name Provider Type    AM Ramsey Beasley, PT Physical Therapist                   Outcome Measures       Row Name 05/21/24 1423 05/21/24 0800       How much help from another person do you currently need...    Turning from your back to your side while in flat bed without using bedrails? 2  -AM 3  -RH (r) CA (t) RH (c)    Moving from lying on back to sitting on the side of a flat bed without bedrails? 2  -AM 3  -RH (r) CA (t) RH (c)    Moving to and from a bed to a chair (including a wheelchair)? 3  -AM 2  -RH (r) CA (t) RH (c)    Standing up from a chair using your arms (e.g., wheelchair, bedside chair)? 3  -AM 2  -RH (r) CA (t) RH (c)    Climbing 3-5 steps with a railing? 1  -AM 2  -RH (r) CA (t) RH (c)    To walk in hospital room? 3  -AM 2  -RH (r) CA (t) RH (c)    AM-PAC 6 Clicks Score (PT) 14   "-AM 14  -CA    Highest Level of Mobility Goal 4 --> Transfer to chair/commode  -AM 4 --> Transfer to chair/commode  -CA              User Key  (r) = Recorded By, (t) = Taken By, (c) = Cosigned By      Initials Name Provider Type    RH Corry Hopkins, RN Registered Nurse    Ramsey Patterson, HERMELINDA Physical Therapist    Laurie Castillo LPN Licensed Nurse                                 Physical Therapy Education       Title: PT OT SLP Therapies (Done)       Topic: Physical Therapy (Done)       Point: Mobility training (Done)       Learning Progress Summary             Patient Acceptance, E,TB, VU by AM at 5/21/2024 1423   Family Acceptance, E,TB, VU by AM at 5/21/2024 1423                         Point: Body mechanics (Done)       Learning Progress Summary             Patient Acceptance, E,TB, VU by AM at 5/21/2024 1423   Family Acceptance, E,TB, VU by AM at 5/21/2024 1423                         Point: Precautions (Done)       Learning Progress Summary             Patient Acceptance, E,TB, VU by AM at 5/21/2024 1423   Family Acceptance, E,TB, VU by AM at 5/21/2024 1423                                         User Key       Initials Effective Dates Name Provider Type Discipline    AM 05/10/21 -  Ramsey Beasley, HERMELINDA Physical Therapist PT                  PT Recommendation and Plan  Planned Therapy Interventions (PT): balance training, bed mobility training, gait training, strengthening, stair training, patient/family education, transfer training  Plan of Care Reviewed With: patient, spouse  Outcome Evaluation: Pt is a 80 y/o male with hx of chronic back pain, DISH syndrome and multiple back sx.  Pt wtih syncopal episode at home with (+) LOC.  (+) UTI.  Thoracic spine X-ray: (-).  Lumbar Spine X-ray: (-).  Pt reports he lives with his wife in a 1 story home with 2 steps to enter into home.  Pt utilizes a RW for all sit to stand transfers and intermittely uses RW for ambulation but \"usually I can walk without the RW\". "  Pt on room air, IV and telemetry this date.  Pt very hesitant to mobilize this date secondary to fear of increased back pain.  Required Mod A x 2 for supine to sit via logroll technique.  Sit to stand with RW x 2 attempts: CGA/Min A.  Pt ambulated 5' with RW with CGA.  Pt with limited cervical rotation and decreased endurance.  Recommend SNF secondary to heavy A of 2 for bed mobility this date.     Time Calculation:         PT Charges       Row Name 05/21/24 1424             Time Calculation    Start Time 1016  -AM      Stop Time 1051  -AM      Time Calculation (min) 35 min  -AM      PT Received On 05/21/24  -AM      PT - Next Appointment 05/22/24  -AM      PT Goal Re-Cert Due Date 06/04/24  -AM                User Key  (r) = Recorded By, (t) = Taken By, (c) = Cosigned By      Initials Name Provider Type    AM Ramsey Beasley, PT Physical Therapist                  Therapy Charges for Today       Code Description Service Date Service Provider Modifiers Qty    29747451662 HC PT EVAL MOD COMPLEXITY 4 5/21/2024 Ramsey Beasley, PT GP 1            PT G-Codes  AM-PAC 6 Clicks Score (PT): 14  PT Discharge Summary  Anticipated Discharge Disposition (PT): skilled nursing facility    Ramsey Beasley, HERMELINDA  5/21/2024

## 2024-05-21 NOTE — CASE MANAGEMENT/SOCIAL WORK
Discharge Planning Assessment   [place]     Patient Name: Almas Joseph  MRN: [id number]  Today's Date: 5/21/2024    Admit Date: 5/20/2024    Plan: D/C Plan: Return home with spouse.   Discharge Needs Assessment       Row Name 05/21/24 1256       Living Environment    People in Home spouse    Name(s) of People in Home Jen    Current Living Arrangements home    Potentially Unsafe Housing Conditions none    In the past 12 months has the electric, gas, oil, or water company threatened to shut off services in your home? No    Primary Care Provided by self    Provides Primary Care For no one    Family Caregiver if Needed spouse    Family Caregiver Names Jen    Quality of Family Relationships helpful;involved;supportive    Able to Return to Prior Arrangements yes       Resource/Environmental Concerns    Resource/Environmental Concerns none    Transportation Concerns none       Transportation Needs    In the past 12 months, has lack of transportation kept you from medical appointments or from getting medications? no    In the past 12 months, has lack of transportation kept you from meetings, work, or from getting things needed for daily living? No       Food Insecurity    Within the past 12 months, you worried that your food would run out before you got the money to buy more. Never true    Within the past 12 months, the food you bought just didn't last and you didn't have money to get more. Never true       Transition Planning    Patient/Family Anticipates Transition to home with family    Patient/Family Anticipated Services at Transition none    Transportation Anticipated family or friend will provide       Discharge Needs Assessment    Readmission Within the Last 30 Days no previous admission in last 30 days    Equipment Currently Used at Home walker, standard;cane, straight;bp cuff;glucometer    Concerns to be Addressed denies needs/concerns at this time    Anticipated Changes Related to Illness none     Equipment Needed After Discharge none    Provided Post Acute Provider List? N/A    Provided Post Acute Provider Quality & Resource List? N/A                   Discharge Plan       Row Name 05/21/24 1257       Plan    Plan D/C Plan: Return home with spouse.    Plan Comments CM spoke with patient at bedside to discuss admission assessment and discharge planning. Patient confirms PCP and pharmacy. Patient has declined meds to bed program at this time. Patient denies any difficulty affording medications at this time. Patient denies any additional needs for services or DME at this time. The patient's wife can transport at d/c. D/C barriers: chest/pelvis CT results pending, ID consult pending, Urology consult pending, IVF, IV antibiotics.               Expected Discharge Date and Time       Expected Discharge Date Expected Discharge Time    May 23, 2024            Demographic Summary       Row Name 05/21/24 1255       General Information    Admission Type observation    Arrived From emergency department    Referral Source admission list    Reason for Consult discharge planning    Preferred Language English       Contact Information    Permission Granted to Share Info With                    Functional Status       Row Name 05/21/24 1255       Functional Status    Usual Activity Tolerance moderate    Current Activity Tolerance moderate       Functional Status, IADL    Medications independent    Meal Preparation independent    Housekeeping independent    Laundry independent    Shopping independent       Mental Status    General Appearance WDL WDL       Mental Status Summary    Recent Changes in Mental Status/Cognitive Functioning no changes       Employment/    Employment Status retired;, previous service           Current or Previous  Service active duty, past     [employer]                    Olga Quick RN     [facility]  [address] [place] [address]  Phone: [phone]  Fax: [phone]

## 2024-05-21 NOTE — PLAN OF CARE
Goal Outcome Evaluation:  Plan of Care Reviewed With: patient        Progress: no change  Outcome Evaluation: Patient was a new admit at the start of the shift. He is here with weakness, UTI, and acute on chronic back pain. IVF infusing over night. IV Rocephin ordered daily. Has c/o back pain throughout the shift. PRN Morphine given. States it helps for a little bit. Cannot move too much due to back pain. May need to be evaluated for safety to return home.

## 2024-05-21 NOTE — CONSULTS
Infectious Diseases Consult Note    Referring Provider: William Samaniego MD    Reason for Consultation: Bacteremia    Patient Care Team:  Provider, No Known as PCP - General    Chief complaint weakness,  dysuria, nausea    Subjective     The patient has a fever as high as 102.7 degrees in the last 24 hours.  The patient is on room air, hemodynamically stable, and is tolerating antimicrobial therapy.    History of present illness:      This is a 79-year-old male presents to the hospital on 5/20/2024 after he passed out at home.  Reports some chills without fever at home denies shortness of breath, cough.  Has had some nausea without vomiting or diarrhea and has had some dysuria.  States he has had issues with difficulty urinating off-and-on but has not seen a urologist for several years    Review of Systems   Review of Systems   Constitutional:  Positive for fatigue.   HENT: Negative.     Eyes: Negative.    Respiratory: Negative.     Cardiovascular: Negative.    Gastrointestinal:  Positive for nausea.   Endocrine: Negative.    Genitourinary:  Positive for dysuria.   Musculoskeletal: Negative.    Skin: Negative.    Neurological:  Positive for syncope and weakness.   Psychiatric/Behavioral: Negative.     All other systems reviewed and are negative.      Medications  Medications Prior to Admission   Medication Sig Dispense Refill Last Dose    amLODIPine (NORVASC) 5 MG tablet Take 0.5 tablets by mouth Every Night.   5/19/2024    atorvastatin (LIPITOR) 20 MG tablet Take 1 tablet by mouth Every Night.   5/19/2024    Cholecalciferol 25 MCG (1000 UT) tablet Take 1 tablet by mouth Every Night.   5/19/2024    losartan (COZAAR) 50 MG tablet Take 0.5 tablets by mouth Every Night.   5/19/2024    magnesium oxide (MAG-OX) 400 MG tablet Take 1 tablet by mouth Every Night.   5/19/2024    Menaquinone-7 (K2 PO) Take 1 tablet by mouth Every Night.   5/19/2024    tamsulosin (FLOMAX) 0.4 MG capsule 24 hr capsule Take 1 capsule by mouth  Daily. 30 capsule 0 Past Month       History  Past Medical History:   Diagnosis Date    Chronic pain     Diabetes mellitus     Hyperlipidemia     Hypertension      Past Surgical History:   Procedure Laterality Date    BACK SURGERY      GALLBLADDER SURGERY      HERNIA REPAIR         Family History  History reviewed. No pertinent family history.    Social History   reports that he has never smoked. He has never used smokeless tobacco. He reports that he does not drink alcohol and does not use drugs.    Allergies  Bactrim [sulfamethoxazole-trimethoprim], Ciprofloxacin, and Nsaids    Objective     Vital Signs   Vital Signs (last 24 hours)         05/20 0700  05/21 0659 05/21 0700  05/21 1626   Most Recent      Temp (°F) 97.9 -  102.7    99.2 -  101     101 (38.3) 05/21 1500    Heart Rate 85 -  114    83 -  95     95 05/21 1254    Resp 16 -  20    12 -  19     19 05/21 1500    /56 -  168/84    128/59 -  141/65     131/57 05/21 1500    SpO2 (%) 91 -  98    94 -  96     94 05/21 1254            Physical Exam:  Physical Exam  Vitals and nursing note reviewed.   Constitutional:       General: He is not in acute distress.     Appearance: He is well-developed and normal weight. He is ill-appearing. He is not diaphoretic.   HENT:      Head: Normocephalic and atraumatic.      Ears:      Comments: Hard of hearing  Eyes:      Conjunctiva/sclera: Conjunctivae normal.      Pupils: Pupils are equal, round, and reactive to light.   Cardiovascular:      Rate and Rhythm: Normal rate and regular rhythm.      Heart sounds: Normal heart sounds, S1 normal and S2 normal.   Pulmonary:      Effort: Pulmonary effort is normal. No respiratory distress.      Breath sounds: Normal breath sounds. No stridor. No wheezing or rales.   Abdominal:      General: Bowel sounds are normal. There is no distension.      Palpations: Abdomen is soft. There is no mass.      Tenderness: There is no abdominal tenderness. There is no guarding.    Musculoskeletal:         General: No deformity. Normal range of motion.      Cervical back: Neck supple.   Skin:     General: Skin is warm and dry.      Coloration: Skin is not pale.      Findings: No erythema or rash.   Neurological:      Mental Status: He is alert and oriented to person, place, and time.      Cranial Nerves: No cranial nerve deficit.   Psychiatric:         Mood and Affect: Mood normal.         Microbiology  Microbiology Results (last 10 days)       Procedure Component Value - Date/Time    Blood Culture - Blood, Arm, Right [[id number]]  (Abnormal) Collected: 05/20/24 1249    Lab Status: Preliminary result Specimen: Blood from Arm, Right Updated: 05/21/24 0808     Blood Culture Abnormal Stain     Gram Stain Aerobic Bottle Gram negative bacilli    Narrative:      Less than seven (7) mL's of blood was collected.  Insufficient quantity may yield false negative results.    Blood Culture ID, PCR - Blood, Arm, Right [[id number]]  (Abnormal) Collected: 05/20/24 1249    Lab Status: Final result Specimen: Blood from Arm, Right Updated: 05/21/24 0808     BCID, PCR Klebsiella pneumoniae group. Identification by BCID2 PCR.     BOTTLE TYPE Aerobic Bottle    Narrative:      No resistance genes detected.    Blood Culture - Blood, Arm, Left [[id number]]  (Abnormal) Collected: 05/20/24 1228    Lab Status: Preliminary result Specimen: Blood from Arm, Left Updated: 05/21/24 0808     Blood Culture Abnormal Stain     Gram Stain Aerobic Bottle Gram negative bacilli    Urine Culture - Urine, Urine, Clean Catch [[id number]]  (Abnormal) Collected: 05/20/24 1139    Lab Status: Preliminary result Specimen: Urine, Clean Catch Updated: 05/21/24 1221     Urine Culture >100,000 CFU/mL Gram Negative Bacilli    Narrative:      Colonization of the urinary tract without infection is common. Treatment is discouraged unless the patient is symptomatic, pregnant, or undergoing an invasive urologic procedure.            Laboratory  Results  from last 7 days   Lab Units 05/21/24  0424   WBC 10*3/mm3 19.09*   HEMOGLOBIN g/dL 12.4*   HEMATOCRIT % 38.7   PLATELETS 10*3/mm3 164     Results from last 7 days   Lab Units 05/21/24  0424   SODIUM mmol/L 138   POTASSIUM mmol/L 3.5   CHLORIDE mmol/L 103   CO2 mmol/L 23.0   BUN mg/dL 13   CREATININE mg/dL 0.89   GLUCOSE mg/dL 169*   CALCIUM mg/dL 8.7     Results from last 7 days   Lab Units 05/21/24  0424   SODIUM mmol/L 138   POTASSIUM mmol/L 3.5   CHLORIDE mmol/L 103   CO2 mmol/L 23.0   BUN mg/dL 13   CREATININE mg/dL 0.89   GLUCOSE mg/dL 169*   CALCIUM mg/dL 8.7     Results from last 7 days   Lab Units 05/20/24  1033   CK TOTAL U/L 267*               Radiology  Imaging Results (Last 72 Hours)       Procedure Component Value Units Date/Time    CT Abdomen Pelvis Without Contrast [[id number]] Collected: 05/21/24 1236     Updated: 05/21/24 1243    Narrative:      CT CHEST WO CONTRAST DIAGNOSTIC, CT ABDOMEN PELVIS WO CONTRAST    Date of Exam: 5/21/2024 12:20 PM EDT    Indication: bacteremia.    Comparison: PA and lateral chest radiograph 2/28/2009. No prior CT chest for comparison.    Technique: Axial CT images were obtained of the chest without contrast administration.  Sagittal and coronal reconstructions were performed.  Automated exposure control and iterative reconstruction methods were used.      Findings:  Mild subsegmental atelectasis is demonstrated within the bilateral lower lobes and bilateral upper lobes. No dense lung consolidations are identified. Heart size is normal. Coronary artery calcifications are present. No adenopathy. No pericardial   effusion. Incidental note of left lateral chest wall lipoma measuring 1.8 x 6.2 cm (series 3 image 40). Trace bibasilar pleural fluid.. No acute or suspicious osseous abnormalities are identified. Mild multilevel degenerative endplate spurring within the   thoracic spine. Benign hemangioma is seen within the T3 vertebrae. Cholecystectomy changes are present, and  the remainder of the imaged upper abdominal organs demonstrate a normal noncontrast appearance.      Impression:      Impression:    1. Mild subsegmental atelectasis is present in both lungs. No dense lung consolidation. Trace bibasilar pleural fluid.  2. Coronary artery calcifications. Correlate with cardiac history.  3. No acute osseous abnormality.      Electronically Signed: Lita Watters MD    5/21/2024 12:41 PM EDT    Workstation ID: [id number]    CT Chest Without Contrast Diagnostic [[id number]] Collected: 05/21/24 1236     Updated: 05/21/24 1243    Narrative:      CT CHEST WO CONTRAST DIAGNOSTIC, CT ABDOMEN PELVIS WO CONTRAST    Date of Exam: 5/21/2024 12:20 PM EDT    Indication: bacteremia.    Comparison: PA and lateral chest radiograph 2/28/2009. No prior CT chest for comparison.    Technique: Axial CT images were obtained of the chest without contrast administration.  Sagittal and coronal reconstructions were performed.  Automated exposure control and iterative reconstruction methods were used.      Findings:  Mild subsegmental atelectasis is demonstrated within the bilateral lower lobes and bilateral upper lobes. No dense lung consolidations are identified. Heart size is normal. Coronary artery calcifications are present. No adenopathy. No pericardial   effusion. Incidental note of left lateral chest wall lipoma measuring 1.8 x 6.2 cm (series 3 image 40). Trace bibasilar pleural fluid.. No acute or suspicious osseous abnormalities are identified. Mild multilevel degenerative endplate spurring within the   thoracic spine. Benign hemangioma is seen within the T3 vertebrae. Cholecystectomy changes are present, and the remainder of the imaged upper abdominal organs demonstrate a normal noncontrast appearance.      Impression:      Impression:    1. Mild subsegmental atelectasis is present in both lungs. No dense lung consolidation. Trace bibasilar pleural fluid.  2. Coronary artery calcifications. Correlate  with cardiac history.  3. No acute osseous abnormality.      Electronically Signed: Lita Watters MD    5/21/2024 12:41 PM EDT    Workstation ID: [id number]    XR Spine Thoracic 3 View [[id number]] Collected: 05/20/24 1106     Updated: 05/20/24 1111    Narrative:      XR SPINE THORACIC 3 VW, XR SPINE LUMBAR COMPLETE 4+VW    Date of Exam: 5/20/2024 10:30 AM EDT    Indication: pain fall    Comparison: Thoracic spine series 7/14/2022 is not available for comparison at the time of this dictation due to outside facility IT issues..    Findings:      The thoracic vertebral bodies demonstrate normal height and alignment without fracture or subluxation. Moderate right anterolateral bridging osteophyte formation is demonstrated within the mid to lower thoracic spine. The disc space heights appear   preserved. No suspicious osteolytic or osteoblastic lesions are identified. The included paraspinal soft tissues are normal.      The lumbar vertebral bodies demonstrate normal height and alignment without fracture or subluxation. There is advanced diminished disc height at L4-5 and moderate diminished disc height at L5-S1. Anterior and posterior osteophyte formation is present at   L4-5 and L5-S1. Anterior osteophytes are also present at L1-L3. No spondylitic defects are identified. Sacroiliac joints are normal. Paraspinal soft tissues are within normal limits.      Impression:      Impression:    1. No acute findings within the thoracic or lumbar spine.  2. Advanced L4-5 and moderate L5-S1 diminished disc space height, along with anterior and posterior osteophyte formation.  3. Moderate anterolateral bridging osteophyte formation is demonstrated within the mid to lower thoracic spine.      Electronically Signed: Lita Watters MD    5/20/2024 11:09 AM EDT    Workstation ID: [id number]    XR Spine Lumbar Complete 4+VW [[id number]] Collected: 05/20/24 1106     Updated: 05/20/24 1111    Narrative:      XR SPINE THORACIC 3 VW, XR SPINE  LUMBAR COMPLETE 4+VW    Date of Exam: 5/20/2024 10:30 AM EDT    Indication: pain fall    Comparison: Thoracic spine series 7/14/2022 is not available for comparison at the time of this dictation due to outside facility IT issues..    Findings:      The thoracic vertebral bodies demonstrate normal height and alignment without fracture or subluxation. Moderate right anterolateral bridging osteophyte formation is demonstrated within the mid to lower thoracic spine. The disc space heights appear   preserved. No suspicious osteolytic or osteoblastic lesions are identified. The included paraspinal soft tissues are normal.      The lumbar vertebral bodies demonstrate normal height and alignment without fracture or subluxation. There is advanced diminished disc height at L4-5 and moderate diminished disc height at L5-S1. Anterior and posterior osteophyte formation is present at   L4-5 and L5-S1. Anterior osteophytes are also present at L1-L3. No spondylitic defects are identified. Sacroiliac joints are normal. Paraspinal soft tissues are within normal limits.      Impression:      Impression:    1. No acute findings within the thoracic or lumbar spine.  2. Advanced L4-5 and moderate L5-S1 diminished disc space height, along with anterior and posterior osteophyte formation.  3. Moderate anterolateral bridging osteophyte formation is demonstrated within the mid to lower thoracic spine.      Electronically Signed: Lita Watters MD    5/20/2024 11:09 AM EDT    Workstation ID: [id number]            Cardiology      Results Review:  I have reviewed all clinical data, test, lab, and imaging results.       Schedule Meds  amLODIPine, 2.5 mg, Oral, Nightly  atorvastatin, 20 mg, Oral, Nightly  cefTRIAXone, 2,000 mg, Intravenous, Q24H  enoxaparin, 40 mg, Subcutaneous, Daily  [Held by provider] losartan, 25 mg, Oral, Nightly  potassium chloride ER, 40 mEq, Oral, Q4H  sodium chloride, 10 mL, Intravenous, Q12H  tamsulosin, 0.4 mg, Oral,  Daily        Infusion Meds  Pharmacy to Dose enoxaparin (LOVENOX),   sodium chloride, 100 mL/hr, Last Rate: 100 mL/hr (05/21/24 0033)        PRN Meds    acetaminophen    senna-docusate sodium **AND** polyethylene glycol **AND** bisacodyl **AND** bisacodyl    Calcium Replacement - Follow Nurse / BPA Driven Protocol    HYDROcodone-acetaminophen    Magnesium Low Dose Replacement - Follow Nurse / BPA Driven Protocol    Morphine    ondansetron    Pharmacy to Dose enoxaparin (LOVENOX)    Phosphorus Replacement - Follow Nurse / BPA Driven Protocol    Potassium Replacement - Follow Nurse / BPA Driven Protocol    sodium chloride    sodium chloride    sodium chloride      Assessment & Plan       Assessment    Klebsiella pneumoniae bacteremia in 2 out of 2 sets.  Most likely secondary to complicated UTI    Complicated UTI with bacteremia.  Urine culture is growing gram-negative bacilli.  CT scan of abdomen pelvis did not show obstructive uropathy    Syncopal episodes prior to admission.  Most likely secondary to above    Type 2 diabetes    History of DISH (diffuse idiopathic skeletal hyperostosis) syndrome    Plan    Continue IV ceftriaxone but increase dose to 2 g IV daily waiting on final blood and urine culture results  Continue supportive care  Shea labs  Case discussed with patient and family member at bedside    MICHELE Godfrey  05/21/24  16:26 EDT    Note is dictated utilizing voice recognition software/Dragon

## 2024-05-21 NOTE — NURSING NOTE
Pt wife reported to this nurse that Pt is becoming confused.ID physician is consulted, he increased IV Rocephin Lactic acid was already ordered, obtained and sent to lab. Will continue to monitor Pt for signs & symptoms of sepsis.

## 2024-05-21 NOTE — PLAN OF CARE
Goal Outcome Evaluation:  Plan of Care Reviewed With: patient        Progress: no change  Outcome Evaluation: Pt is a 80 y/o male with hx of chronic back pain, DISH syndrome and multiple back sx. Pt wtih syncopal episode at home with (+) LOC. (+) UTI. Thoracic spine X-ray: (-). Lumbar Spine X-ray: (-). Pt. lives at home w/ spouse and ambulates w/ cane/walker support, spouse provides ADL support/assist PRN secondary to limited ROM. Pt. reports severe pain supine in bed, decreased 5/10 w/ sitting activity EOB, mod A x 2 for bed mobility. Provided min A for SPS transfer EOB to armchair, increased max A for donning/doffing shorts, patient incontinent of urine and provided assist for posterior hygiene. Pt. not safe to d/c home at this time and will require SNF placement at d/c to address aforementioned deficits pending progress.      Anticipated Discharge Disposition (OT): skilled nursing facility

## 2024-05-21 NOTE — PLAN OF CARE
"Goal Outcome Evaluation:  Plan of Care Reviewed With: patient, spouse           Outcome Evaluation: Pt is a 80 y/o male with hx of chronic back pain, DISH syndrome and multiple back sx.  Pt wtih syncopal episode at home with (+) LOC.  (+) UTI.  Thoracic spine X-ray: (-).  Lumbar Spine X-ray: (-).  Pt reports he lives with his wife in a 1 story home with 2 steps to enter into home.  Pt utilizes a RW for all sit to stand transfers and intermittely uses RW for ambulation but \"usually I can walk without the RW\".  Pt on room air, IV and telemetry this date.  Pt very hesitant to mobilize this date secondary to fear of increased back pain.  Required Mod A x 2 for supine to sit via logroll technique.  Sit to stand with RW x 2 attempts: CGA/Min A.  Pt ambulated 5' with RW with CGA.  Pt with limited cervical rotation and decreased endurance.  Recommend SNF secondary to heavy A of 2 for bed mobility this date.      Anticipated Discharge Disposition (PT): skilled nursing facility                        "

## 2024-05-22 LAB
ANION GAP SERPL CALCULATED.3IONS-SCNC: 12 MMOL/L (ref 5–15)
BACTERIA SPEC AEROBE CULT: ABNORMAL
BUN SERPL-MCNC: 13 MG/DL (ref 8–23)
BUN/CREAT SERPL: 15.9 (ref 7–25)
CALCIUM SPEC-SCNC: 8.1 MG/DL (ref 8.6–10.5)
CHLORIDE SERPL-SCNC: 102 MMOL/L (ref 98–107)
CO2 SERPL-SCNC: 22 MMOL/L (ref 22–29)
CREAT SERPL-MCNC: 0.82 MG/DL (ref 0.76–1.27)
D-LACTATE SERPL-SCNC: 1.7 MMOL/L (ref 0.5–2)
DEPRECATED RDW RBC AUTO: 47.1 FL (ref 37–54)
EGFRCR SERPLBLD CKD-EPI 2021: 88.3 ML/MIN/1.73
ERYTHROCYTE [DISTWIDTH] IN BLOOD BY AUTOMATED COUNT: 15.3 % (ref 12.3–15.4)
GLUCOSE SERPL-MCNC: 173 MG/DL (ref 65–99)
HBA1C MFR BLD: 6.69 % (ref 4.8–5.6)
HCT VFR BLD AUTO: 35.6 % (ref 37.5–51)
HGB BLD-MCNC: 11.1 G/DL (ref 13–17.7)
MCH RBC QN AUTO: 26.5 PG (ref 26.6–33)
MCHC RBC AUTO-ENTMCNC: 31.2 G/DL (ref 31.5–35.7)
MCV RBC AUTO: 85 FL (ref 79–97)
PLATELET # BLD AUTO: 145 10*3/MM3 (ref 140–450)
PMV BLD AUTO: 9.9 FL (ref 6–12)
POTASSIUM SERPL-SCNC: 3.4 MMOL/L (ref 3.5–5.2)
RBC # BLD AUTO: 4.19 10*6/MM3 (ref 4.14–5.8)
SODIUM SERPL-SCNC: 136 MMOL/L (ref 136–145)
WBC NRBC COR # BLD AUTO: 12.96 10*3/MM3 (ref 3.4–10.8)

## 2024-05-22 PROCEDURE — 25010000002 ENOXAPARIN PER 10 MG: Performed by: INTERNAL MEDICINE

## 2024-05-22 PROCEDURE — 80048 BASIC METABOLIC PNL TOTAL CA: CPT

## 2024-05-22 PROCEDURE — 25010000002 MORPHINE PER 10 MG: Performed by: INTERNAL MEDICINE

## 2024-05-22 PROCEDURE — 97530 THERAPEUTIC ACTIVITIES: CPT

## 2024-05-22 PROCEDURE — 83605 ASSAY OF LACTIC ACID: CPT

## 2024-05-22 PROCEDURE — 85027 COMPLETE CBC AUTOMATED: CPT

## 2024-05-22 PROCEDURE — 97116 GAIT TRAINING THERAPY: CPT

## 2024-05-22 PROCEDURE — 25010000002 CEFTRIAXONE PER 250 MG: Performed by: NURSE PRACTITIONER

## 2024-05-22 PROCEDURE — 83036 HEMOGLOBIN GLYCOSYLATED A1C: CPT | Performed by: NURSE PRACTITIONER

## 2024-05-22 RX ORDER — POTASSIUM CHLORIDE 20 MEQ/1
40 TABLET, EXTENDED RELEASE ORAL EVERY 4 HOURS
Status: DISCONTINUED | OUTPATIENT
Start: 2024-05-22 | End: 2024-05-22

## 2024-05-22 RX ORDER — POTASSIUM CHLORIDE 1.5 G/1.58G
40 POWDER, FOR SOLUTION ORAL EVERY 4 HOURS
Status: COMPLETED | OUTPATIENT
Start: 2024-05-22 | End: 2024-05-22

## 2024-05-22 RX ADMIN — POTASSIUM CHLORIDE 40 MEQ: 1.5 POWDER, FOR SOLUTION ORAL at 13:10

## 2024-05-22 RX ADMIN — MORPHINE SULFATE 2 MG: 2 INJECTION, SOLUTION INTRAMUSCULAR; INTRAVENOUS at 21:09

## 2024-05-22 RX ADMIN — MORPHINE SULFATE 2 MG: 2 INJECTION, SOLUTION INTRAMUSCULAR; INTRAVENOUS at 13:05

## 2024-05-22 RX ADMIN — POTASSIUM CHLORIDE 40 MEQ: 1.5 POWDER, FOR SOLUTION ORAL at 09:06

## 2024-05-22 RX ADMIN — TAMSULOSIN HYDROCHLORIDE 0.4 MG: 0.4 CAPSULE ORAL at 09:06

## 2024-05-22 RX ADMIN — ATORVASTATIN CALCIUM 20 MG: 20 TABLET, FILM COATED ORAL at 21:08

## 2024-05-22 RX ADMIN — MORPHINE SULFATE 2 MG: 2 INJECTION, SOLUTION INTRAMUSCULAR; INTRAVENOUS at 05:17

## 2024-05-22 RX ADMIN — ENOXAPARIN SODIUM 40 MG: 100 INJECTION SUBCUTANEOUS at 18:35

## 2024-05-22 RX ADMIN — AMLODIPINE BESYLATE 2.5 MG: 2.5 TABLET ORAL at 21:08

## 2024-05-22 RX ADMIN — Medication 10 ML: at 21:09

## 2024-05-22 RX ADMIN — Medication 10 ML: at 09:06

## 2024-05-22 RX ADMIN — CEFTRIAXONE 2000 MG: 2 INJECTION, POWDER, FOR SOLUTION INTRAMUSCULAR; INTRAVENOUS at 13:13

## 2024-05-22 NOTE — PLAN OF CARE
Goal Outcome Evaluation:         Almas Joseph presents with functional mobility impairments which indicate the need for skilled intervention. Pt is mobilizing much better today, up in chair on arrival and able ot stand several time and walk in hallway with and without AD.  Wife present in room and reports pt back to near baseline.  Changed d/c recommendation to home with assist and  PT. Tolerating session today without incident. Will continue to follow and progress as tolerated.             Anticipated Discharge Disposition (PT): home with assist, home with home health

## 2024-05-22 NOTE — PROGRESS NOTES
Infectious Diseases Progress Note      LOS: 1 day   Patient Care Team:  Provider, No Known as PCP - General    Chief Complaint: Weakness, dysuria and nausea at admission    Subjective       Patient had a temperature as high as 100.1 degrees in the last 24 hours.  He is on room air and hemodynamically stable and tolerating antimicrobial therapy.  Feeling much better today      Review of Systems:   Review of Systems   Constitutional: Negative.  Positive for fatigue.   HENT: Negative.     Eyes: Negative.    Respiratory: Negative.     Cardiovascular: Negative.    Gastrointestinal: Negative.    Endocrine: Negative.    Genitourinary: Negative.    Musculoskeletal: Negative.    Skin: Negative.    Neurological: Negative.    Psychiatric/Behavioral: Negative.     All other systems reviewed and are negative.       Objective     Vital Signs  Temp:  [97.6 °F (36.4 °C)-100.1 °F (37.8 °C)] 98.6 °F (37 °C)  Heart Rate:  [79-94] 86  Resp:  [13-25] 25  BP: (111-149)/(52-76) 135/64    Physical Exam:  Physical Exam  Vitals and nursing note reviewed.   Constitutional:       General: He is not in acute distress.     Appearance: Normal appearance. He is well-developed and normal weight. He is not diaphoretic.   HENT:      Head: Normocephalic and atraumatic.      Ears:      Comments: Hard of hearing  Eyes:      Conjunctiva/sclera: Conjunctivae normal.      Pupils: Pupils are equal, round, and reactive to light.   Cardiovascular:      Rate and Rhythm: Normal rate and regular rhythm.      Heart sounds: Normal heart sounds, S1 normal and S2 normal.   Pulmonary:      Effort: Pulmonary effort is normal. No respiratory distress.      Breath sounds: Normal breath sounds. No stridor. No wheezing or rales.   Abdominal:      General: Bowel sounds are normal. There is no distension.      Palpations: Abdomen is soft. There is no mass.      Tenderness: There is no abdominal tenderness. There is no guarding.   Musculoskeletal:         General: No  deformity. Normal range of motion.      Cervical back: Neck supple.   Skin:     General: Skin is warm and dry.      Coloration: Skin is not pale.      Findings: No erythema or rash.   Neurological:      Mental Status: He is alert and oriented to person, place, and time.      Cranial Nerves: No cranial nerve deficit.   Psychiatric:         Mood and Affect: Mood normal.          Results Review:    I have reviewed all clinical data, test, lab, and imaging results.     Radiology  No Radiology Exams Resulted Within Past 24 Hours    Cardiology    Laboratory    Results from last 7 days   Lab Units 05/22/24  0310 05/21/24  0424 05/20/24  1033   WBC 10*3/mm3 12.96* 19.09* 19.60*   HEMOGLOBIN g/dL 11.1* 12.4* 13.4   HEMATOCRIT % 35.6* 38.7 42.5   PLATELETS 10*3/mm3 145 164 191     Results from last 7 days   Lab Units 05/22/24  0310 05/21/24  1512 05/21/24  0424 05/20/24  1033   SODIUM mmol/L 136  --  138 138   POTASSIUM mmol/L 3.4* 4.1 3.5 4.1   CHLORIDE mmol/L 102  --  103 101   CO2 mmol/L 22.0  --  23.0 24.0   BUN mg/dL 13  --  13 16   CREATININE mg/dL 0.82  --  0.89 0.98   GLUCOSE mg/dL 173*  --  169* 180*   ALBUMIN g/dL  --   --   --  4.3   BILIRUBIN mg/dL  --   --   --  0.7   ALK PHOS U/L  --   --   --  86   AST (SGOT) U/L  --   --   --  23   ALT (SGPT) U/L  --   --   --  20   CALCIUM mg/dL 8.1*  --  8.7 9.4     Results from last 7 days   Lab Units 05/20/24  1033   CK TOTAL U/L 267*             Microbiology   Microbiology Results (last 10 days)       Procedure Component Value - Date/Time    Blood Culture - Blood, Arm, Right [[id number]]  (Abnormal) Collected: 05/20/24 1249    Lab Status: Preliminary result Specimen: Blood from Arm, Right Updated: 05/22/24 0650     Blood Culture Gram Negative Bacilli     Isolated from Aerobic Bottle     Gram Stain Aerobic Bottle Gram negative bacilli    Narrative:      Less than seven (7) mL's of blood was collected.  Insufficient quantity may yield false negative results.    Blood Culture  ID, PCR - Blood, Arm, Right [[id number]]  (Abnormal) Collected: 05/20/24 1249    Lab Status: Final result Specimen: Blood from Arm, Right Updated: 05/21/24 0808     BCID, PCR Klebsiella pneumoniae group. Identification by BCID2 PCR.     BOTTLE TYPE Aerobic Bottle    Narrative:      No resistance genes detected.    Blood Culture - Blood, Arm, Left [[id number]]  (Abnormal) Collected: 05/20/24 1228    Lab Status: Preliminary result Specimen: Blood from Arm, Left Updated: 05/22/24 0650     Blood Culture Gram Negative Bacilli     Isolated from Aerobic Bottle     Gram Stain Aerobic Bottle Gram negative bacilli    Urine Culture - Urine, Urine, Clean Catch [[id number]]  (Abnormal)  (Susceptibility) Collected: 05/20/24 1139    Lab Status: Final result Specimen: Urine, Clean Catch Updated: 05/22/24 0935     Urine Culture >100,000 CFU/mL Klebsiella pneumoniae ssp pneumoniae    Narrative:      Colonization of the urinary tract without infection is common. Treatment is discouraged unless the patient is symptomatic, pregnant, or undergoing an invasive urologic procedure.    Susceptibility        Klebsiella pneumoniae ssp pneumoniae      SUKHWINDER      Amoxicillin + Clavulanate Susceptible      Ampicillin Resistant      Ampicillin + Sulbactam Susceptible      Cefazolin Susceptible      Cefepime Susceptible      Ceftazidime Susceptible      Ceftriaxone Susceptible      Gentamicin Susceptible      Levofloxacin Susceptible      Nitrofurantoin Intermediate      Piperacillin + Tazobactam Susceptible      Trimethoprim + Sulfamethoxazole Susceptible                                   Medication Review:       Schedule Meds  amLODIPine, 2.5 mg, Oral, Nightly  atorvastatin, 20 mg, Oral, Nightly  cefTRIAXone, 2,000 mg, Intravenous, Q24H  enoxaparin, 40 mg, Subcutaneous, Daily  [Held by provider] losartan, 25 mg, Oral, Nightly  sodium chloride, 10 mL, Intravenous, Q12H  tamsulosin, 0.4 mg, Oral, Daily        Infusion Meds  Pharmacy to Dose enoxaparin  (LOVENOX),   sodium chloride, 100 mL/hr, Last Rate: 100 mL/hr (05/21/24 2052)        PRN Meds    acetaminophen    senna-docusate sodium **AND** polyethylene glycol **AND** bisacodyl **AND** bisacodyl    Calcium Replacement - Follow Nurse / BPA Driven Protocol    HYDROcodone-acetaminophen    Magnesium Low Dose Replacement - Follow Nurse / BPA Driven Protocol    Morphine    ondansetron    Pharmacy to Dose enoxaparin (LOVENOX)    Phosphorus Replacement - Follow Nurse / BPA Driven Protocol    Potassium Replacement - Follow Nurse / BPA Driven Protocol    sodium chloride    sodium chloride    sodium chloride        Assessment & Plan       Antimicrobial Therapy   1.  IV ceftriaxone        2.        3.        4.        5.          Assessment     Klebsiella pneumoniae bacteremia in 2 out of 2 sets.  Most likely secondary to complicated UTI     Complicated UTI with bacteremia.  Urine culture is growing Klebsiella pneumoniae.  CT scan of abdomen pelvis did not show obstructive uropathy     Syncopal episodes prior to admission.  Most likely secondary to above     Type 2 diabetes     History of DISH (diffuse idiopathic skeletal hyperostosis) syndrome     Plan     Continue IV ceftriaxone 2 g IV daily waiting on final blood culture results  Will likely be able to switch over to p.o. levofloxacin at discharge  Continue supportive care  A.m. labs  Case discussed with patient and family member at bedside  Case discussed with hospitalist    Julia Beth, MICHELE  05/22/24  15:12 EDT    Note is dictated utilizing voice recognition software/Dragon

## 2024-05-22 NOTE — PROGRESS NOTES
[facility] MEDICINE SERVICE  DAILY PROGRESS NOTE    NAME: Almas Joseph  : 1945  MRN: [id number]      LOS: 1 day     PROVIDER OF SERVICE: MICHELE Rivers    Chief Complaint: Pre-syncope    Subjective:     Interval History:  History taken from: patient  Patient Complaints: back pain  Patient Denies:  SOB, CP, dizziness, HA, chills, fever    Review of Systems:   Review of Systems   Constitutional:  Negative for chills and fever.   Respiratory:  Negative for shortness of breath.    Cardiovascular:  Negative for chest pain.   Musculoskeletal:  Positive for back pain.   Neurological:  Negative for dizziness and headaches.       Objective:     Vital Signs  Temp:  [97.6 °F (36.4 °C)-101 °F (38.3 °C)] 98.2 °F (36.8 °C)  Heart Rate:  [79-95] 92  Resp:  [12-19] 15  BP: (111-149)/(52-76) 141/66   Body mass index is 28.35 kg/m².    Physical Exam  Physical Exam  Constitutional:       Appearance: Normal appearance. He is not diaphoretic.   HENT:      Head: Normocephalic and atraumatic.      Nose: Nose normal.      Mouth/Throat:      Mouth: Mucous membranes are moist.      Pharynx: Oropharynx is clear.   Eyes:      Extraocular Movements: Extraocular movements intact.      Conjunctiva/sclera: Conjunctivae normal.      Pupils: Pupils are equal, round, and reactive to light.   Cardiovascular:      Rate and Rhythm: Normal rate and regular rhythm.      Pulses: Normal pulses.   Pulmonary:      Effort: Pulmonary effort is normal.   Abdominal:      General: Abdomen is flat.      Palpations: Abdomen is soft.   Musculoskeletal:         General: Normal range of motion.      Cervical back: Normal range of motion and neck supple.   Skin:     General: Skin is warm and dry.   Neurological:      General: No focal deficit present.      Mental Status: He is alert and oriented to person, place, and time. Mental status is at baseline.   Psychiatric:         Mood and Affect: Mood normal.         Behavior: Behavior normal.          Thought Content: Thought content normal.         Judgment: Judgment normal.         Scheduled Meds   amLODIPine, 2.5 mg, Oral, Nightly  atorvastatin, 20 mg, Oral, Nightly  cefTRIAXone, 2,000 mg, Intravenous, Q24H  enoxaparin, 40 mg, Subcutaneous, Daily  [Held by provider] losartan, 25 mg, Oral, Nightly  potassium chloride, 40 mEq, Oral, Q4H  sodium chloride, 10 mL, Intravenous, Q12H  tamsulosin, 0.4 mg, Oral, Daily       PRN Meds     acetaminophen    senna-docusate sodium **AND** polyethylene glycol **AND** bisacodyl **AND** bisacodyl    Calcium Replacement - Follow Nurse / BPA Driven Protocol    HYDROcodone-acetaminophen    Magnesium Low Dose Replacement - Follow Nurse / BPA Driven Protocol    Morphine    ondansetron    Pharmacy to Dose enoxaparin (LOVENOX)    Phosphorus Replacement - Follow Nurse / BPA Driven Protocol    Potassium Replacement - Follow Nurse / BPA Driven Protocol    sodium chloride    sodium chloride    sodium chloride   Infusions  Pharmacy to Dose enoxaparin (LOVENOX),   sodium chloride, 100 mL/hr, Last Rate: 100 mL/hr (05/21/24 2052)          Diagnostic Data    Results from last 7 days   Lab Units 05/22/24  0310 05/21/24  0424 05/20/24  1033   WBC 10*3/mm3 12.96*   < > 19.60*   HEMOGLOBIN g/dL 11.1*   < > 13.4   HEMATOCRIT % 35.6*   < > 42.5   PLATELETS 10*3/mm3 145   < > 191   GLUCOSE mg/dL 173*   < > 180*   CREATININE mg/dL 0.82   < > 0.98   BUN mg/dL 13   < > 16   SODIUM mmol/L 136   < > 138   POTASSIUM mmol/L 3.4*   < > 4.1   AST (SGOT) U/L  --   --  23   ALT (SGPT) U/L  --   --  20   ALK PHOS U/L  --   --  86   BILIRUBIN mg/dL  --   --  0.7   ANION GAP mmol/L 12.0   < > 13.0    < > = values in this interval not displayed.       CT Abdomen Pelvis Without Contrast    Result Date: 5/21/2024  Impression: 1. Mild subsegmental atelectasis is present in both lungs. No dense lung consolidation. Trace bibasilar pleural fluid. 2. Coronary artery calcifications. Correlate with cardiac history. 3.  No acute osseous abnormality. Electronically Signed: Lita Watters MD  5/21/2024 12:41 PM EDT  Workstation ID: [id number]    CT Chest Without Contrast Diagnostic    Result Date: 5/21/2024  Impression: 1. Mild subsegmental atelectasis is present in both lungs. No dense lung consolidation. Trace bibasilar pleural fluid. 2. Coronary artery calcifications. Correlate with cardiac history. 3. No acute osseous abnormality. Electronically Signed: Lita Watters MD  5/21/2024 12:41 PM EDT  Workstation ID: [id number]       I reviewed the patient's new clinical results.    Assessment/Plan:     Active and Resolved Problems  Active Hospital Problems    Diagnosis  POA    **Pre-syncope [R55]  Yes      Resolved Hospital Problems   No resolved problems to display.       Presyncope  Status post fall  Chronic back pain  Acute cystitis without hematuria  Bacteremia  Sepsis triggered by elevated temperature and leukocytosis along with known infection  History of diabetes mellitus type 2    Patient was admitted after arriving to the ED with complaints of syncopal episode and falling.  Patient underwent x-rays which showed no acute findings.  Blood cultures were obtained which are positive for Klebsiella pneumoniae, infectious disease has been consulted, appreciate recommendations.  Patient does have a urinary tract infection, urine culture pending however preliminary result shows positive for gram-negative bacilli.  Patient WBC 19.09.  CT abdomen ordered and shows mild subsegmental atelectasis present in both lungs, no dense lung consolidation.  Trace bibasilar pleural fluid.  Coronary artery calcifications and no acute osseous abnormality.    Patient has an extensive history regarding urinary tract.  Reports recurrent UTIs.  States he was on Flomax in the past however had difficulty getting a provider to fill it.  Urology has been consulted and will continue patient on Flomax at this time.  Continue IV fluids and further plan to come  pending infectious disease consult.    Patient triggering sepsis, will obtain stat lactic acid at this time.  PT/OT needs to evaluate patient.    5/22/2024  Patient otherwise feels well this morning.  Reports that he feels better than he did yesterday.  Infectious disease has seen patient and Rocephin dose was increased to 2 g daily while waiting on final blood and urine culture results.  Urine culture positive for Klebsiella pneumoniae, blood culture positive for Klebsiella pneumoniae on preliminary result.  Urology has seen patient, per urology continue Flomax and to have patient follow-up in office next week with Dr. Romero.  Per urology, check postvoid residual and call if patient is retaining more than 400 cc.  Urology has signed off at this time.  Patient did develop fevers yesterday, improved with Tylenol.  Lactic acid was 2.4, repeat lactic yesterday was 1.4. repeat lactic this a.m. is pending.  PT/OT has evaluated patient and are recommending home with home health at this time.  Patient and patient family declined home health so patient will return home with assist from wife.    DVT prophylaxis:  Medical DVT prophylaxis orders are present.         Code status is   Code Status and Medical Interventions:   Ordered at: 05/20/24 1346     Level Of Support Discussed With:    Patient     Code Status (Patient has no pulse and is not breathing):    CPR (Attempt to Resuscitate)     Medical Interventions (Patient has pulse or is breathing):    Full Support       Plan for disposition: Pending course 5-    Time: 30 minutes    Signature: Electronically signed by MICHELE Rivers, 05/22/24, 11:44 EDT.  [facility] Hospitalist Team

## 2024-05-22 NOTE — THERAPY TREATMENT NOTE
"Subjective: Pt agreeable to therapeutic plan of care.    Objective:   Pt sitting up in chair, wife present, pt wanting to change his gown and underwear, just had assistance for bath from [facility]    Bed mobility - not assessed, pt up in chair on arrival  Transfers - CGA and with rolling walker, stand x 3 from chair, does well pushing up from chair arms and taking his time  Ambulation - 150 feet CGA and with rolling walker x 100', without AD x 50'    Vitals: WNL    Pain: 3 VAS   Location: back  Intervention for pain: Repositioned and Therapeutic Presence    Education: Transfer Training and Gait Training    Assessment: Almas Joseph presents with functional mobility impairments which indicate the need for skilled intervention. Pt is mobilizing much better today, up in chair on arrival and able ot stand several time and walk in hallway with and without AD.  Wife present in room and reports pt back to near baseline.  Changed d/c recommendation to home with assist and HH PT. Tolerating session today without incident. Will continue to follow and progress as tolerated.     Plan/Recommendations:   If medically appropriate, Low Intensity Therapy recommended post-acute care - This is recommended as therapy feels this patient would require 2-3 visits per week. OP or HH would be the best option depending on patient's home bound status. Consider, if the patient has other  \"skilled\" needs such as wounds, IV antibiotics, etc. Combined with \"low intensity\" could also equate to a SNF. If patient is medically complex, consider LTAC. Pt requires no DME at discharge.     Pt desires Home with family assist and Home Health at discharge. Pt cooperative; agreeable to therapeutic recommendations and plan of care.         Basic Mobility 6-click:  Rollin = Total, A lot = 2, A little = 3; 4 = None  Supine>Sit:   1 = Total, A lot = 2, A little = 3; 4 = None   Sit>Stand with arms:  1 = Total, A lot = 2, A little = 3; 4 = " None  Bed>Chair:   1 = Total, A lot = 2, A little = 3; 4 = None  Ambulate in room:  1 = Total, A lot = 2, A little = 3; 4 = None  3-5 Steps with railin = Total, A lot = 2, A little = 3; 4 = None  Score: 18    Modified [place]: N/A = No pre-op stroke/TIA    Post-Tx Position: Up in Chair, Alarms activated, and Call light and personal items within reach  PPE: gloves and surgical mask

## 2024-05-22 NOTE — NURSING NOTE
Pt let aid and this nurse attempt to draw potassium lab, and after 2 attempts Pt refused to have anymore blood draw attempts today. Pt's last potassium level was 3.4.

## 2024-05-22 NOTE — PLAN OF CARE
Goal Outcome Evaluation:  Plan of Care Reviewed With: patient, spouse    Pt continues to improve. Pt still c/o back pain at an 8 at times throughout the shift. Especially after laying on hia back for too long. Pt remains in good spirits. Wife was at bedside for over half of the shift.

## 2024-05-22 NOTE — CASE MANAGEMENT/SOCIAL WORK
Continued Stay Note   [place]     Patient Name: Almas Joseph  MRN: [id number]  Today's Date: 5/22/2024    Admit Date: 5/20/2024    Plan: Return home with spouse. [facility].   Discharge Plan       Row Name 05/22/24 1634       Plan    Plan Return home with spouse. [facility].                          Luana Raymundo RN     Office phone: [phone]  Office fax: [phone]

## 2024-05-22 NOTE — CONSULTS
"     [facility]      Patient Identification:  NAME:  Almas Joseph  Age:  79 y.o.   Sex:  male   :  1945   MRN:  [id number]     Chief complaint: Syncope at home.    History of present illness:      Very pleasant hard of hearing patient that has not been seen at [facility] for a few years.  Long history of BPH, history of taking Flomax without difficulty but stopped due to being lost to follow-up.  The patient was brought to the hospital for syncope.  Received Flomax yesterday without recurrent dizziness.  The patient stated \"I do very well with Flomax \"the patient is urinating well with 500 cc output in the morning of 2024 visualized in the urinal at bedside.  CT abdomen was completed on 24 however not read yet as of this morning 2024.  Receiving antibiotics for urinary tract infection.    In hospital:  Asked to see    Past medical history:  Past Medical History:   Diagnosis Date    Chronic pain     Diabetes mellitus     Hyperlipidemia     Hypertension        Past surgical history:  Past Surgical History:   Procedure Laterality Date    BACK SURGERY      GALLBLADDER SURGERY      HERNIA REPAIR         Allergies:  Bactrim [sulfamethoxazole-trimethoprim], Ciprofloxacin, and Nsaids    Home medications:  Medications Prior to Admission   Medication Sig Dispense Refill Last Dose    amLODIPine (NORVASC) 5 MG tablet Take 0.5 tablets by mouth Every Night.   2024    atorvastatin (LIPITOR) 20 MG tablet Take 1 tablet by mouth Every Night.   2024    Cholecalciferol 25 MCG (1000 UT) tablet Take 1 tablet by mouth Every Night.   2024    losartan (COZAAR) 50 MG tablet Take 0.5 tablets by mouth Every Night.   2024    magnesium oxide (MAG-OX) 400 MG tablet Take 1 tablet by mouth Every Night.   2024    Menaquinone-7 (K2 PO) Take 1 tablet by mouth Every Night.   2024    tamsulosin (FLOMAX) 0.4 MG capsule 24 hr capsule Take 1 capsule by mouth Daily. 30 capsule 0 Past " Month        Hospital medications:  amLODIPine, 2.5 mg, Oral, Nightly  atorvastatin, 20 mg, Oral, Nightly  cefTRIAXone, 2,000 mg, Intravenous, Q24H  enoxaparin, 40 mg, Subcutaneous, Daily  [Held by provider] losartan, 25 mg, Oral, Nightly  potassium chloride, 40 mEq, Oral, Q4H  sodium chloride, 10 mL, Intravenous, Q12H  tamsulosin, 0.4 mg, Oral, Daily      Pharmacy to Dose enoxaparin (LOVENOX),   sodium chloride, 100 mL/hr, Last Rate: 100 mL/hr (24)        acetaminophen    senna-docusate sodium **AND** polyethylene glycol **AND** bisacodyl **AND** bisacodyl    Calcium Replacement - Follow Nurse / BPA Driven Protocol    HYDROcodone-acetaminophen    Magnesium Low Dose Replacement - Follow Nurse / BPA Driven Protocol    Morphine    ondansetron    Pharmacy to Dose enoxaparin (LOVENOX)    Phosphorus Replacement - Follow Nurse / BPA Driven Protocol    Potassium Replacement - Follow Nurse / BPA Driven Protocol    sodium chloride    sodium chloride    sodium chloride    Family history:  History reviewed. No pertinent family history.    Social history:  Social History     Tobacco Use    Smoking status: Never    Smokeless tobacco: Never   Vaping Use    Vaping status: Never Used   Substance Use Topics    Alcohol use: Never    Drug use: Never       Review of systems:      Positive for:  nothing  Negative for:  chest pain, cough, sob, o/w neg    Objective:  TMax 24 hours:   Temp (24hrs), Av.4 °F (37.4 °C), Min:97.6 °F (36.4 °C), Max:101 °F (38.3 °C)      Vitals Ranges:   Temp:  [97.6 °F (36.4 °C)-101 °F (38.3 °C)] 97.6 °F (36.4 °C)  Heart Rate:  [79-95] 79  Resp:  [12-19] 16  BP: (111-149)/(52-76) 130/62    Intake/Output Last 3 shifts:  I/O last 3 completed shifts:  In: 442.9 [P.O.:120; I.V.:322.9]  Out: 650 [Urine:650]     Physical Exam:    General Appearance:    Alert, cooperative, NAD   Back:     No CVA tenderness   Lungs:     Respirations unlabored, no wheezing    Heart:    RRR, intact peripheral pulses    Abdomen:     Soft, NDNT, no masses, no guarding   Neuro/Psych:   Orientation intact, mood/affect pleasant       Results review:   I reviewed the patient's new clinical results.    Data review:  Lab Results (last 24 hours)       Procedure Component Value Units Date/Time    Hemoglobin A1c [[id number]]  (Abnormal) Collected: 05/22/24 0310    Specimen: Blood Updated: 05/22/24 0511     Hemoglobin A1C 6.69 %     Basic Metabolic Panel [[id number]]  (Abnormal) Collected: 05/22/24 0310    Specimen: Blood Updated: 05/22/24 0355     Glucose 173 mg/dL      BUN 13 mg/dL      Creatinine 0.82 mg/dL      Sodium 136 mmol/L      Potassium 3.4 mmol/L      Chloride 102 mmol/L      CO2 22.0 mmol/L      Calcium 8.1 mg/dL      BUN/Creatinine Ratio 15.9     Anion Gap 12.0 mmol/L      eGFR 88.3 mL/min/1.73     Narrative:      GFR Normal >60  Chronic Kidney Disease <60  Kidney Failure <15    The GFR formula is only valid for adults with stable renal function between ages 18 and 70.    CBC (No Diff) [[id number]]  (Abnormal) Collected: 05/22/24 0310    Specimen: Blood Updated: 05/22/24 0336     WBC 12.96 10*3/mm3      RBC 4.19 10*6/mm3      Hemoglobin 11.1 g/dL      Hematocrit 35.6 %      MCV 85.0 fL      MCH 26.5 pg      MCHC 31.2 g/dL      RDW 15.3 %      RDW-SD 47.1 fl      MPV 9.9 fL      Platelets 145 10*3/mm3     STAT Lactic Acid, Reflex [[id number]]  (Normal) Collected: 05/21/24 1817    Specimen: Blood from Arm, Right Updated: 05/21/24 1850     Lactate 1.4 mmol/L     Lactic Acid, Plasma [[id number]]  (Abnormal) Collected: 05/21/24 1512    Specimen: Blood from Arm, Left Updated: 05/21/24 1650     Lactate 2.4 mmol/L     Potassium [[id number]]  (Normal) Collected: 05/21/24 1512    Specimen: Blood from Arm, Left Updated: 05/21/24 1644     Potassium 4.1 mmol/L      Comment: Slight hemolysis detected by analyzer. Result may be falsely elevated.       Urine Culture - Urine, Urine, Clean Catch [[id number]]  (Abnormal) Collected: 05/20/24 1138     Specimen: Urine, Clean Catch Updated: 05/21/24 1221     Urine Culture >100,000 CFU/mL Gram Negative Bacilli    Narrative:      Colonization of the urinary tract without infection is common. Treatment is discouraged unless the patient is symptomatic, pregnant, or undergoing an invasive urologic procedure.    Blood Culture - Blood, Arm, Left [[id number]]  (Abnormal) Collected: 05/20/24 1228    Specimen: Blood from Arm, Left Updated: 05/21/24 0808     Blood Culture Abnormal Stain     Gram Stain Aerobic Bottle Gram negative bacilli    Blood Culture - Blood, Arm, Right [[id number]]  (Abnormal) Collected: 05/20/24 1249    Specimen: Blood from Arm, Right Updated: 05/21/24 0808     Blood Culture Abnormal Stain     Gram Stain Aerobic Bottle Gram negative bacilli    Narrative:      Less than seven (7) mL's of blood was collected.  Insufficient quantity may yield false negative results.    Blood Culture ID, PCR - Blood, Arm, Right [[id number]]  (Abnormal) Collected: 05/20/24 1249    Specimen: Blood from Arm, Right Updated: 05/21/24 0808     BCID, PCR Klebsiella pneumoniae group. Identification by BCID2 PCR.     BOTTLE TYPE Aerobic Bottle    Narrative:      No resistance genes detected.             Imaging:  Imaging Results (Last 24 Hours)       Procedure Component Value Units Date/Time    CT Abdomen Pelvis Without Contrast [[id number]] Collected: 05/21/24 1236     Updated: 05/21/24 1243    Narrative:      CT CHEST WO CONTRAST DIAGNOSTIC, CT ABDOMEN PELVIS WO CONTRAST    Date of Exam: 5/21/2024 12:20 PM EDT    Indication: bacteremia.    Comparison: PA and lateral chest radiograph 2/28/2009. No prior CT chest for comparison.    Technique: Axial CT images were obtained of the chest without contrast administration.  Sagittal and coronal reconstructions were performed.  Automated exposure control and iterative reconstruction methods were used.      Findings:  Mild subsegmental atelectasis is demonstrated within the bilateral lower  lobes and bilateral upper lobes. No dense lung consolidations are identified. Heart size is normal. Coronary artery calcifications are present. No adenopathy. No pericardial   effusion. Incidental note of left lateral chest wall lipoma measuring 1.8 x 6.2 cm (series 3 image 40). Trace bibasilar pleural fluid.. No acute or suspicious osseous abnormalities are identified. Mild multilevel degenerative endplate spurring within the   thoracic spine. Benign hemangioma is seen within the T3 vertebrae. Cholecystectomy changes are present, and the remainder of the imaged upper abdominal organs demonstrate a normal noncontrast appearance.      Impression:      Impression:    1. Mild subsegmental atelectasis is present in both lungs. No dense lung consolidation. Trace bibasilar pleural fluid.  2. Coronary artery calcifications. Correlate with cardiac history.  3. No acute osseous abnormality.      Electronically Signed: Lita Watters MD    5/21/2024 12:41 PM EDT    Workstation ID: [id number]    CT Chest Without Contrast Diagnostic [[id number]] Collected: 05/21/24 1236     Updated: 05/21/24 1243    Narrative:      CT CHEST WO CONTRAST DIAGNOSTIC, CT ABDOMEN PELVIS WO CONTRAST    Date of Exam: 5/21/2024 12:20 PM EDT    Indication: bacteremia.    Comparison: PA and lateral chest radiograph 2/28/2009. No prior CT chest for comparison.    Technique: Axial CT images were obtained of the chest without contrast administration.  Sagittal and coronal reconstructions were performed.  Automated exposure control and iterative reconstruction methods were used.      Findings:  Mild subsegmental atelectasis is demonstrated within the bilateral lower lobes and bilateral upper lobes. No dense lung consolidations are identified. Heart size is normal. Coronary artery calcifications are present. No adenopathy. No pericardial   effusion. Incidental note of left lateral chest wall lipoma measuring 1.8 x 6.2 cm (series 3 image 40). Trace bibasilar  pleural fluid.. No acute or suspicious osseous abnormalities are identified. Mild multilevel degenerative endplate spurring within the   thoracic spine. Benign hemangioma is seen within the T3 vertebrae. Cholecystectomy changes are present, and the remainder of the imaged upper abdominal organs demonstrate a normal noncontrast appearance.      Impression:      Impression:    1. Mild subsegmental atelectasis is present in both lungs. No dense lung consolidation. Trace bibasilar pleural fluid.  2. Coronary artery calcifications. Correlate with cardiac history.  3. No acute osseous abnormality.      Electronically Signed: Lita Watters MD    5/21/2024 12:41 PM EDT    Workstation ID: [id number]               Assessment:     BPH with weak urine stream  Urinary tract infection, recurrent  [facility]up at [facility] few [facility]    Plan:     Continue Flomax, caution with dizziness, the patient states he does very well on Flomax.  Primary care to continue with culture specific antibiotics for urinary tract infection  Call if any abnormal findings on CT abdomen  Call if urine retention [pvr >400]  Otherwise, will sign off and have patient follow-up in office next week with Dr. Nick Jones, APRN  05/22/24  06:44 EDT

## 2024-05-23 VITALS
DIASTOLIC BLOOD PRESSURE: 75 MMHG | OXYGEN SATURATION: 95 % | WEIGHT: 192 LBS | HEART RATE: 81 BPM | HEIGHT: 69 IN | RESPIRATION RATE: 16 BRPM | SYSTOLIC BLOOD PRESSURE: 151 MMHG | TEMPERATURE: 97.8 F | BODY MASS INDEX: 28.44 KG/M2

## 2024-05-23 LAB
ANION GAP SERPL CALCULATED.3IONS-SCNC: 10.1 MMOL/L (ref 5–15)
BACTERIA SPEC AEROBE CULT: ABNORMAL
BACTERIA SPEC AEROBE CULT: ABNORMAL
BASOPHILS # BLD AUTO: 0.03 10*3/MM3 (ref 0–0.2)
BASOPHILS NFR BLD AUTO: 0.3 % (ref 0–1.5)
BUN SERPL-MCNC: 10 MG/DL (ref 8–23)
BUN/CREAT SERPL: 12.2 (ref 7–25)
CALCIUM SPEC-SCNC: 8.5 MG/DL (ref 8.6–10.5)
CHLORIDE SERPL-SCNC: 103 MMOL/L (ref 98–107)
CO2 SERPL-SCNC: 22.9 MMOL/L (ref 22–29)
CREAT SERPL-MCNC: 0.82 MG/DL (ref 0.76–1.27)
DEPRECATED RDW RBC AUTO: 46.2 FL (ref 37–54)
EGFRCR SERPLBLD CKD-EPI 2021: 88.3 ML/MIN/1.73
EOSINOPHIL # BLD AUTO: 0.04 10*3/MM3 (ref 0–0.4)
EOSINOPHIL NFR BLD AUTO: 0.4 % (ref 0.3–6.2)
ERYTHROCYTE [DISTWIDTH] IN BLOOD BY AUTOMATED COUNT: 15.1 % (ref 12.3–15.4)
GLUCOSE SERPL-MCNC: 139 MG/DL (ref 65–99)
GRAM STN SPEC: ABNORMAL
GRAM STN SPEC: ABNORMAL
HCT VFR BLD AUTO: 36.6 % (ref 37.5–51)
HGB BLD-MCNC: 11.7 G/DL (ref 13–17.7)
IMM GRANULOCYTES # BLD AUTO: 0.06 10*3/MM3 (ref 0–0.05)
IMM GRANULOCYTES NFR BLD AUTO: 0.6 % (ref 0–0.5)
ISOLATED FROM: ABNORMAL
ISOLATED FROM: ABNORMAL
LYMPHOCYTES # BLD AUTO: 2.22 10*3/MM3 (ref 0.7–3.1)
LYMPHOCYTES NFR BLD AUTO: 22.2 % (ref 19.6–45.3)
MCH RBC QN AUTO: 27.1 PG (ref 26.6–33)
MCHC RBC AUTO-ENTMCNC: 32 G/DL (ref 31.5–35.7)
MCV RBC AUTO: 84.7 FL (ref 79–97)
MONOCYTES # BLD AUTO: 0.89 10*3/MM3 (ref 0.1–0.9)
MONOCYTES NFR BLD AUTO: 8.9 % (ref 5–12)
NEUTROPHILS NFR BLD AUTO: 6.75 10*3/MM3 (ref 1.7–7)
NEUTROPHILS NFR BLD AUTO: 67.6 % (ref 42.7–76)
NRBC BLD AUTO-RTO: 0 /100 WBC (ref 0–0.2)
PLATELET # BLD AUTO: 164 10*3/MM3 (ref 140–450)
PMV BLD AUTO: 9.5 FL (ref 6–12)
POTASSIUM SERPL-SCNC: 3.5 MMOL/L (ref 3.5–5.2)
RBC # BLD AUTO: 4.32 10*6/MM3 (ref 4.14–5.8)
SODIUM SERPL-SCNC: 136 MMOL/L (ref 136–145)
WBC NRBC COR # BLD AUTO: 9.99 10*3/MM3 (ref 3.4–10.8)

## 2024-05-23 PROCEDURE — 85025 COMPLETE CBC W/AUTO DIFF WBC: CPT | Performed by: NURSE PRACTITIONER

## 2024-05-23 PROCEDURE — 25010000002 MORPHINE PER 10 MG: Performed by: INTERNAL MEDICINE

## 2024-05-23 PROCEDURE — 25810000003 SODIUM CHLORIDE 0.9 % SOLUTION: Performed by: INTERNAL MEDICINE

## 2024-05-23 PROCEDURE — 80048 BASIC METABOLIC PNL TOTAL CA: CPT | Performed by: NURSE PRACTITIONER

## 2024-05-23 RX ORDER — LEVOFLOXACIN 750 MG/1
750 TABLET, FILM COATED ORAL EVERY 24 HOURS
Status: DISCONTINUED | OUTPATIENT
Start: 2024-05-23 | End: 2024-05-23 | Stop reason: HOSPADM

## 2024-05-23 RX ORDER — LEVOFLOXACIN 750 MG/1
750 TABLET, FILM COATED ORAL EVERY 24 HOURS
Qty: 11 TABLET | Refills: 0 | Status: SHIPPED | OUTPATIENT
Start: 2024-05-24 | End: 2024-06-04

## 2024-05-23 RX ORDER — TAMSULOSIN HYDROCHLORIDE 0.4 MG/1
1 CAPSULE ORAL DAILY
Qty: 30 CAPSULE | Refills: 0 | Status: SHIPPED | OUTPATIENT
Start: 2024-05-23 | End: 2024-06-22

## 2024-05-23 RX ADMIN — LEVOFLOXACIN 750 MG: 750 TABLET, FILM COATED ORAL at 11:33

## 2024-05-23 RX ADMIN — Medication 10 ML: at 07:02

## 2024-05-23 RX ADMIN — MORPHINE SULFATE 2 MG: 2 INJECTION, SOLUTION INTRAMUSCULAR; INTRAVENOUS at 07:02

## 2024-05-23 RX ADMIN — SODIUM CHLORIDE 100 ML/HR: 9 INJECTION, SOLUTION INTRAVENOUS at 02:38

## 2024-05-23 RX ADMIN — MORPHINE SULFATE 2 MG: 2 INJECTION, SOLUTION INTRAMUSCULAR; INTRAVENOUS at 01:19

## 2024-05-23 RX ADMIN — TAMSULOSIN HYDROCHLORIDE 0.4 MG: 0.4 CAPSULE ORAL at 07:01

## 2024-05-23 NOTE — PLAN OF CARE
Goal Outcome Evaluation:  Plan of Care Reviewed With: patient        Progress: improving  Outcome Evaluation: patient to be discharged.

## 2024-05-23 NOTE — PLAN OF CARE
Problem: UTI (Urinary Tract Infection)  Goal: Improved Infection Symptoms  Outcome: Ongoing, Not Progressing     Problem: Adult Inpatient Plan of Care  Goal: Plan of Care Review  Outcome: Ongoing, Not Progressing  Flowsheets (Taken 5/23/2024 0308)  Progress: no change  Plan of Care Reviewed With: patient  Goal: Patient-Specific Goal (Individualized)  Outcome: Ongoing, Not Progressing  Goal: Absence of Hospital-Acquired Illness or Injury  Outcome: Ongoing, Not Progressing  Intervention: Identify and Manage Fall Risk  Recent Flowsheet Documentation  Taken 5/23/2024 0250 by Sangita No, RN  Safety Promotion/Fall Prevention:   assistive device/personal items within reach   clutter free environment maintained   fall prevention program maintained   nonskid shoes/slippers when out of bed   room organization consistent   safety round/check completed  Taken 5/23/2024 0015 by Sangita No, RN  Safety Promotion/Fall Prevention:   assistive device/personal items within reach   clutter free environment maintained   fall prevention program maintained   nonskid shoes/slippers when out of bed   room organization consistent   safety round/check completed  Taken 5/22/2024 2234 by Sangita No, RN  Safety Promotion/Fall Prevention:   assistive device/personal items within reach   clutter free environment maintained   fall prevention program maintained   nonskid shoes/slippers when out of bed   room organization consistent   safety round/check completed  Taken 5/22/2024 2016 by Sangita No, RN  Safety Promotion/Fall Prevention:   assistive device/personal items within reach   clutter free environment maintained   fall prevention program maintained   nonskid shoes/slippers when out of bed   room organization consistent   safety round/check completed  Intervention: Prevent Skin Injury  Recent Flowsheet Documentation  Taken 5/22/2024 2016 by Sangita No, RN  Body Position:   position changed independently    supine  Intervention: Prevent and Manage VTE (Venous Thromboembolism) Risk  Recent Flowsheet Documentation  Taken 5/22/2024 2016 by Sangita No RN  Activity Management: activity encouraged  VTE Prevention/Management: sequential compression devices off  Range of Motion: active ROM (range of motion) encouraged  Intervention: Prevent Infection  Recent Flowsheet Documentation  Taken 5/23/2024 0250 by Sangita No RN  Infection Prevention:   environmental surveillance performed   hand hygiene promoted   single patient room provided  Taken 5/23/2024 0015 by Sangita No RN  Infection Prevention:   environmental surveillance performed   hand hygiene promoted   single patient room provided  Taken 5/22/2024 2234 by Sangita No RN  Infection Prevention:   environmental surveillance performed   hand hygiene promoted   rest/sleep promoted   single patient room provided  Taken 5/22/2024 2016 by Sangita No RN  Infection Prevention:   environmental surveillance performed   hand hygiene promoted   single patient room provided  Goal: Optimal Comfort and Wellbeing  Outcome: Ongoing, Not Progressing  Intervention: Monitor Pain and Promote Comfort  Recent Flowsheet Documentation  Taken 5/22/2024 2016 by Sangita No RN  Pain Management Interventions:   care clustered   diversional activity provided  Intervention: Provide Person-Centered Care  Recent Flowsheet Documentation  Taken 5/22/2024 2016 by Sangita No RN  Trust Relationship/Rapport:   care explained   choices provided  Goal: Readiness for Transition of Care  Outcome: Ongoing, Not Progressing  Intervention: Mutually Develop Transition Plan  Recent Flowsheet Documentation  Taken 5/23/2024 0306 by Sangita No RN  Equipment Needed After Discharge: none  Equipment Currently Used at Home:   walker, standard   cane, straight   bp cuff   glucometer  Anticipated Changes Related to Illness: none  Transportation Anticipated: family or friend will  provide  Transportation Concerns: none  Current Discharge Risk: physical impairment  Concerns to be Addressed: patient refuses services  Readmission Within the Last 30 Days: no previous admission in last 30 days  Patient/Family Anticipated Services at Transition: none  Patient/Family Anticipates Transition to: home with family     Problem: Pain Acute  Goal: Acceptable Pain Control and Functional Ability  Outcome: Ongoing, Not Progressing  Intervention: Prevent or Manage Pain  Recent Flowsheet Documentation  Taken 5/23/2024 0250 by Sangita No RN  Medication Review/Management:   medications reviewed   high-risk medications identified  Taken 5/23/2024 0015 by Sangita No RN  Medication Review/Management:   medications reviewed   high-risk medications identified  Taken 5/22/2024 2016 by Sangita No RN  Sensory Stimulation Regulation: lighting decreased  Bowel Elimination Promotion:   adequate fluid intake promoted   ambulation promoted  Sleep/Rest Enhancement:   awakenings minimized   relaxation techniques promoted  Medication Review/Management:   medications reviewed   high-risk medications identified  Intervention: Develop Pain Management Plan  Recent Flowsheet Documentation  Taken 5/22/2024 2016 by Sangita No RN  Pain Management Interventions:   care clustered   diversional activity provided  Intervention: Optimize Psychosocial Wellbeing  Recent Flowsheet Documentation  Taken 5/22/2024 2016 by Sangita No RN  Supportive Measures: active listening utilized     Problem: Diabetes Comorbidity  Goal: Blood Glucose Level Within Targeted Range  Outcome: Ongoing, Not Progressing     Problem: Hypertension Comorbidity  Goal: Blood Pressure in Desired Range  Outcome: Ongoing, Not Progressing  Intervention: Maintain Blood Pressure Management  Recent Flowsheet Documentation  Taken 5/23/2024 0250 by Sangita No RN  Medication Review/Management:   medications reviewed   high-risk medications  identified  Taken 5/23/2024 0015 by Sangita No RN  Medication Review/Management:   medications reviewed   high-risk medications identified  Taken 5/22/2024 2016 by Sangita No RN  Syncope Management: position changed slowly  Medication Review/Management:   medications reviewed   high-risk medications identified     Problem: Fall Injury Risk  Goal: Absence of Fall and Fall-Related Injury  Outcome: Ongoing, Not Progressing  Intervention: Identify and Manage Contributors  Recent Flowsheet Documentation  Taken 5/23/2024 0250 by Sangita No RN  Medication Review/Management:   medications reviewed   high-risk medications identified  Taken 5/23/2024 0015 by Sangita No RN  Medication Review/Management:   medications reviewed   high-risk medications identified  Taken 5/22/2024 2016 by Sangita No RN  Medication Review/Management:   medications reviewed   high-risk medications identified  Self-Care Promotion:   independence encouraged   BADL personal objects within reach  Intervention: Promote Injury-Free Environment  Recent Flowsheet Documentation  Taken 5/23/2024 0250 by Sangita No RN  Safety Promotion/Fall Prevention:   assistive device/personal items within reach   clutter free environment maintained   fall prevention program maintained   nonskid shoes/slippers when out of bed   room organization consistent   safety round/check completed  Taken 5/23/2024 0015 by Sangita No RN  Safety Promotion/Fall Prevention:   assistive device/personal items within reach   clutter free environment maintained   fall prevention program maintained   nonskid shoes/slippers when out of bed   room organization consistent   safety round/check completed  Taken 5/22/2024 2234 by Sangita No RN  Safety Promotion/Fall Prevention:   assistive device/personal items within reach   clutter free environment maintained   fall prevention program maintained   nonskid shoes/slippers when out of bed   room organization  consistent   safety round/check completed  Taken 5/22/2024 2016 by Sangita No, RN  Safety Promotion/Fall Prevention:   assistive device/personal items within reach   clutter free environment maintained   fall prevention program maintained   nonskid shoes/slippers when out of bed   room organization consistent   safety round/check completed     Problem: Skin Injury Risk Increased  Goal: Skin Health and Integrity  Outcome: Ongoing, Not Progressing  Intervention: Optimize Skin Protection  Recent Flowsheet Documentation  Taken 5/22/2024 2016 by Sangita No RN  Pressure Reduction Techniques: frequent weight shift encouraged  Head of Bed (HOB) Positioning: HOB at 20-30 degrees  Pressure Reduction Devices: specialty bed utilized     Problem: Adjustment to Illness (Sepsis/Septic Shock)  Goal: Optimal Coping  Outcome: Ongoing, Not Progressing  Intervention: Optimize Psychosocial Adjustment to Illness  Recent Flowsheet Documentation  Taken 5/22/2024 2016 by Sangita No RN  Supportive Measures: active listening utilized  Family/Support System Care:   support provided   self-care encouraged     Problem: Bleeding (Sepsis/Septic Shock)  Goal: Absence of Bleeding  Outcome: Ongoing, Not Progressing  Intervention: Monitor and Manage Bleeding  Recent Flowsheet Documentation  Taken 5/22/2024 2016 by Sangita No RN  Bleeding Precautions:   monitored for signs of bleeding   gentle oral care promoted     Problem: Glycemic Control Impaired (Sepsis/Septic Shock)  Goal: Blood Glucose Level Within Desired Range  Outcome: Ongoing, Not Progressing     Problem: Infection Progression (Sepsis/Septic Shock)  Goal: Absence of Infection Signs and Symptoms  Outcome: Ongoing, Not Progressing  Intervention: Initiate Sepsis Management  Recent Flowsheet Documentation  Taken 5/23/2024 0250 by Sangita No, RN  Infection Prevention:   environmental surveillance performed   hand hygiene promoted   single patient room provided  Taken  5/23/2024 0015 by Sangita No, RN  Infection Prevention:   environmental surveillance performed   hand hygiene promoted   single patient room provided  Taken 5/22/2024 2234 by Sangita No, RN  Infection Prevention:   environmental surveillance performed   hand hygiene promoted   rest/sleep promoted   single patient room provided  Taken 5/22/2024 2016 by Sangita No, RN  Infection Prevention:   environmental surveillance performed   hand hygiene promoted   single patient room provided  Intervention: Promote Recovery  Recent Flowsheet Documentation  Taken 5/22/2024 2016 by Sangita No, RN  Activity Management: activity encouraged  Sleep/Rest Enhancement:   awakenings minimized   relaxation techniques promoted     Problem: Nutrition Impaired (Sepsis/Septic Shock)  Goal: Optimal Nutrition Intake  Outcome: Ongoing, Not Progressing   Goal Outcome Evaluation:  Plan of Care Reviewed With: patient        Progress: no change     Alert and oriented x 4. Able to verbalize needs and wants. Takes medication whole or in halves with water and tolerates with minimal discomfort noted. States that he has DISH and that affects his swallowing. C/O back pain through out the night, PRN Morphine and Norco administered with temporary effect noted. Does not require O2 therapy at this time. Per case management, patient has refused SNF placement and home health at discharge so patient plans on discharging home with spouse. Continues to receive IV Rocephin, no s/s of adverse/allergic reaction noted. NaCl currently infusing at 100 ml/hr and tolerating well. Continues to be followed by infectious disease, states ok to switch to oral antibiotics at discharge. Urology has signed off at this time, wants patient to follow up in 1 week. Currently in bed, awake. Call bell in reach.

## 2024-05-23 NOTE — CASE MANAGEMENT/SOCIAL WORK
Case Management Discharge Note      Final Note: Routine home    Provided Post Acute Provider List?: N/A  Provided Post Acute Provider Quality & Resource List?: N/A    Selected Continued Care - Discharged on 5/23/2024 Admission date: 5/20/2024 - Discharge disposition: Home or Self Care       Transportation Services  Private: Car    Final Discharge Disposition Code: 01 - home or self-care

## 2024-05-23 NOTE — PROGRESS NOTES
Infectious Diseases Progress Note      LOS: 2 days   Patient Care Team:  Provider, No Known as PCP - General    Chief Complaint: Weakness, dysuria and nausea at admission    Subjective       Patient had a temperature as high as 100.1 degrees in the last 24 hours.  He is on room air and hemodynamically stable and tolerating antimicrobial therapy.  Feeling much better today      Review of Systems:   Review of Systems   Constitutional: Negative.    HENT: Negative.     Eyes: Negative.    Respiratory: Negative.     Cardiovascular: Negative.    Gastrointestinal: Negative.    Endocrine: Negative.    Genitourinary: Negative.    Musculoskeletal: Negative.    Skin: Negative.    Neurological: Negative.    Psychiatric/Behavioral: Negative.     All other systems reviewed and are negative.       Objective     Vital Signs  Temp:  [97.2 °F (36.2 °C)-98.8 °F (37.1 °C)] 97.8 °F (36.6 °C)  Heart Rate:  [80-91] 81  Resp:  [12-18] 16  BP: (136-155)/(54-80) 151/75    Physical Exam:  Physical Exam  Vitals and nursing note reviewed.   Constitutional:       General: He is not in acute distress.     Appearance: Normal appearance. He is well-developed and normal weight. He is not diaphoretic.   HENT:      Head: Normocephalic and atraumatic.      Ears:      Comments: Hard of hearing  Eyes:      Conjunctiva/sclera: Conjunctivae normal.      Pupils: Pupils are equal, round, and reactive to light.   Cardiovascular:      Rate and Rhythm: Normal rate and regular rhythm.      Heart sounds: Normal heart sounds, S1 normal and S2 normal.   Pulmonary:      Effort: Pulmonary effort is normal. No respiratory distress.      Breath sounds: Normal breath sounds. No stridor. No wheezing or rales.   Abdominal:      General: Bowel sounds are normal. There is no distension.      Palpations: Abdomen is soft. There is no mass.      Tenderness: There is no abdominal tenderness. There is no guarding.   Musculoskeletal:         General: No deformity. Normal range of  motion.      Cervical back: Neck supple.   Skin:     General: Skin is warm and dry.      Coloration: Skin is not pale.      Findings: No erythema or rash.   Neurological:      Mental Status: He is alert and oriented to person, place, and time.      Cranial Nerves: No cranial nerve deficit.   Psychiatric:         Mood and Affect: Mood normal.          Results Review:    I have reviewed all clinical data, test, lab, and imaging results.     Radiology  No Radiology Exams Resulted Within Past 24 Hours    Cardiology    Laboratory    Results from last 7 days   Lab Units 05/23/24  0125 05/22/24  0310 05/21/24  0424 05/20/24  1033   WBC 10*3/mm3 9.99 12.96* 19.09* 19.60*   HEMOGLOBIN g/dL 11.7* 11.1* 12.4* 13.4   HEMATOCRIT % 36.6* 35.6* 38.7 42.5   PLATELETS 10*3/mm3 164 145 164 191     Results from last 7 days   Lab Units 05/23/24  0125 05/22/24  0310 05/21/24  1512 05/21/24  0424 05/20/24  1033   SODIUM mmol/L 136 136  --  138 138   POTASSIUM mmol/L 3.5 3.4* 4.1 3.5 4.1   CHLORIDE mmol/L 103 102  --  103 101   CO2 mmol/L 22.9 22.0  --  23.0 24.0   BUN mg/dL 10 13  --  13 16   CREATININE mg/dL 0.82 0.82  --  0.89 0.98   GLUCOSE mg/dL 139* 173*  --  169* 180*   ALBUMIN g/dL  --   --   --   --  4.3   BILIRUBIN mg/dL  --   --   --   --  0.7   ALK PHOS U/L  --   --   --   --  86   AST (SGOT) U/L  --   --   --   --  23   ALT (SGPT) U/L  --   --   --   --  20   CALCIUM mg/dL 8.5* 8.1*  --  8.7 9.4     Results from last 7 days   Lab Units 05/20/24  1033   CK TOTAL U/L 267*             Microbiology   Microbiology Results (last 10 days)       Procedure Component Value - Date/Time    Blood Culture - Blood, Arm, Right [[id number]]  (Abnormal)  (Susceptibility) Collected: 05/20/24 1249    Lab Status: Final result Specimen: Blood from Arm, Right Updated: 05/23/24 0642     Blood Culture Klebsiella pneumoniae ssp pneumoniae     Isolated from Aerobic Bottle     Gram Stain Aerobic Bottle Gram negative bacilli    Narrative:      Less than  seven (7) mL's of blood was collected.  Insufficient quantity may yield false negative results.    Susceptibility        Klebsiella pneumoniae ssp pneumoniae      SUKHWINDER      Amoxicillin + Clavulanate Susceptible      Ampicillin Resistant      Ampicillin + Sulbactam Susceptible      Cefepime Susceptible      Ceftazidime Susceptible      Ceftriaxone Susceptible      Gentamicin Susceptible      Levofloxacin Susceptible      Piperacillin + Tazobactam Susceptible      Trimethoprim + Sulfamethoxazole Susceptible                       Susceptibility Comments       Klebsiella pneumoniae ssp pneumoniae    Cefazolin sensitivity will not be reported for Enterobacteriaceae in non-urine isolates. If cefazolin is preferred, please call the microbiology lab to request an E-test.  With the exception of urinary-sourced infections, aminoglycosides should not be used as monotherapy.               Blood Culture ID, PCR - Blood, Arm, Right [[id number]]  (Abnormal) Collected: 05/20/24 1249    Lab Status: Final result Specimen: Blood from Arm, Right Updated: 05/21/24 0808     BCID, PCR Klebsiella pneumoniae group. Identification by BCID2 PCR.     BOTTLE TYPE Aerobic Bottle    Narrative:      No resistance genes detected.    Blood Culture - Blood, Arm, Left [[id number]]  (Abnormal) Collected: 05/20/24 1228    Lab Status: Final result Specimen: Blood from Arm, Left Updated: 05/23/24 0642     Blood Culture Klebsiella pneumoniae ssp pneumoniae     Isolated from Aerobic Bottle     Gram Stain Aerobic Bottle Gram negative bacilli    Narrative:      Refer to previous blood culture collected on 05/20/2024 1249 for MICs.    Urine Culture - Urine, Urine, Clean Catch [[id number]]  (Abnormal)  (Susceptibility) Collected: 05/20/24 1139    Lab Status: Final result Specimen: Urine, Clean Catch Updated: 05/22/24 0935     Urine Culture >100,000 CFU/mL Klebsiella pneumoniae ssp pneumoniae    Narrative:      Colonization of the urinary tract without infection is  common. Treatment is discouraged unless the patient is symptomatic, pregnant, or undergoing an invasive urologic procedure.    Susceptibility        Klebsiella pneumoniae ssp pneumoniae      SUKHWINDER      Amoxicillin + Clavulanate Susceptible      Ampicillin Resistant      Ampicillin + Sulbactam Susceptible      Cefazolin Susceptible      Cefepime Susceptible      Ceftazidime Susceptible      Ceftriaxone Susceptible      Gentamicin Susceptible      Levofloxacin Susceptible      Nitrofurantoin Intermediate      Piperacillin + Tazobactam Susceptible      Trimethoprim + Sulfamethoxazole Susceptible                                   Medication Review:       Schedule Meds  amLODIPine, 2.5 mg, Oral, Nightly  atorvastatin, 20 mg, Oral, Nightly  enoxaparin, 40 mg, Subcutaneous, Daily  levoFLOXacin, 750 mg, Oral, Q24H  [Held by provider] losartan, 25 mg, Oral, Nightly  sodium chloride, 10 mL, Intravenous, Q12H  tamsulosin, 0.4 mg, Oral, Daily        Infusion Meds  Pharmacy to Dose enoxaparin (LOVENOX),   sodium chloride, 100 mL/hr, Last Rate: Stopped (05/23/24 1234)        PRN Meds    acetaminophen    senna-docusate sodium **AND** polyethylene glycol **AND** bisacodyl **AND** bisacodyl    Calcium Replacement - Follow Nurse / BPA Driven Protocol    HYDROcodone-acetaminophen    Magnesium Low Dose Replacement - Follow Nurse / BPA Driven Protocol    Morphine    ondansetron    Pharmacy to Dose enoxaparin (LOVENOX)    Phosphorus Replacement - Follow Nurse / BPA Driven Protocol    Potassium Replacement - Follow Nurse / BPA Driven Protocol    sodium chloride    sodium chloride    sodium chloride        Assessment & Plan       Antimicrobial Therapy   1.  IV ceftriaxone        2.        3.        4.        5.          Assessment     Klebsiella pneumoniae bacteremia in 2 out of 2 sets.  Most likely secondary to complicated UTI     Complicated UTI with bacteremia.  Urine culture is growing Klebsiella pneumoniae.  CT scan of abdomen pelvis  did not show obstructive uropathy     Syncopal episodes prior to admission.  Most likely secondary to above     Type 2 diabetes     History of DISH (diffuse idiopathic skeletal hyperostosis) syndrome     Plan     Discontinue IV ceftriaxone  Start IV Levaquin 750 mg daily for 12 days for 14 days of treatment  QTc interval was appropriate for fluoroquinolone use  Continue supportive care  Okay to discharge from Infectious Disease standpoint  Case discussed with patient and family member at bedside  Case discussed with hospitalist MICHELE Christine  05/23/24  13:42 EDT    Note is dictated utilizing voice recognition software/Dragon